# Patient Record
Sex: FEMALE | Race: WHITE | NOT HISPANIC OR LATINO | Employment: FULL TIME | URBAN - METROPOLITAN AREA
[De-identification: names, ages, dates, MRNs, and addresses within clinical notes are randomized per-mention and may not be internally consistent; named-entity substitution may affect disease eponyms.]

---

## 2017-01-19 ENCOUNTER — HOSPITAL ENCOUNTER (OUTPATIENT)
Facility: MEDICAL CENTER | Age: 27
End: 2017-01-19
Payer: COMMERCIAL

## 2017-01-19 LAB
ANION GAP SERPL CALC-SCNC: 10 MMOL/L (ref 0–11.9)
BDY FAT % MEASURED: 47.9 %
BP DIAS: 74 MMHG
BP SYS: 116 MMHG
BUN SERPL-MCNC: 11 MG/DL (ref 8–22)
CALCIUM SERPL-MCNC: 9.6 MG/DL (ref 8.5–10.5)
CHLORIDE SERPL-SCNC: 107 MMOL/L (ref 96–112)
CHOLEST SERPL-MCNC: 197 MG/DL (ref 100–199)
CO2 SERPL-SCNC: 22 MMOL/L (ref 20–33)
CREAT SERPL-MCNC: 0.82 MG/DL (ref 0.5–1.4)
DIABETES HTDIA: NO
EVENT NAME HTEVT: NORMAL
GFR SERPL CREATININE-BSD FRML MDRD: >60 ML/MIN/1.73 M 2
GLUCOSE SERPL-MCNC: 84 MG/DL (ref 65–99)
HDLC SERPL-MCNC: 42 MG/DL
HYPERTENSION HTHYP: NO
LDLC SERPL CALC-MCNC: 135 MG/DL
POTASSIUM SERPL-SCNC: 4.1 MMOL/L (ref 3.6–5.5)
SCREENING LOC CITY HTCIT: NORMAL
SCREENING LOC STATE HTSTA: NORMAL
SCREENING LOCATION HTLOC: NORMAL
SODIUM SERPL-SCNC: 139 MMOL/L (ref 135–145)
SUBSCRIBER ID HTSID: NORMAL
TRIGL SERPL-MCNC: 99 MG/DL (ref 0–149)

## 2017-02-10 ENCOUNTER — TELEPHONE (OUTPATIENT)
Dept: MEDICAL GROUP | Facility: PHYSICIAN GROUP | Age: 27
End: 2017-02-10

## 2017-02-10 DIAGNOSIS — E66.01 MORBID OBESITY WITH BMI OF 40.0-44.9, ADULT (HCC): ICD-10-CM

## 2017-02-11 NOTE — TELEPHONE ENCOUNTER
Expand All Collapse All    1. Caller Name: Pt                                 Call Back Number:[phone] (home)      SPECIFIC Action To Be Taken: Referral needed     2. Diagnosis/Clinical Reason for Request: Gastric Sleeve     3. Has an appt been made? No    4. Specialty & Provider Name/Lab/Imaging Location:  [facility]     5. Patient approves a detailed message N/A    6. Patient preferred communication method: MyChart Voicemail Letter    7. Patient informed they will receive a return phone call from Primary Care office ONLY if there are any questions before processing their request and to call back if they haven't received a call in 5 days.

## 2017-03-09 ENCOUNTER — HOSPITAL ENCOUNTER (OUTPATIENT)
Dept: LAB | Facility: MEDICAL CENTER | Age: 27
End: 2017-03-09
Attending: FAMILY MEDICINE
Payer: COMMERCIAL

## 2017-03-09 LAB
25(OH)D3 SERPL-MCNC: 17 NG/ML (ref 30–100)
ALBUMIN SERPL BCP-MCNC: 4 G/DL (ref 3.2–4.9)
ALBUMIN/GLOB SERPL: 1.4 G/DL
ALP SERPL-CCNC: 75 U/L (ref 30–99)
ALT SERPL-CCNC: 30 U/L (ref 2–50)
ANION GAP SERPL CALC-SCNC: 7 MMOL/L (ref 0–11.9)
AST SERPL-CCNC: 26 U/L (ref 12–45)
BILIRUB SERPL-MCNC: 0.5 MG/DL (ref 0.1–1.5)
BUN SERPL-MCNC: 10 MG/DL (ref 8–22)
CALCIUM SERPL-MCNC: 9.4 MG/DL (ref 8.5–10.5)
CHLORIDE SERPL-SCNC: 105 MMOL/L (ref 96–112)
CO2 SERPL-SCNC: 25 MMOL/L (ref 20–33)
CREAT SERPL-MCNC: 0.72 MG/DL (ref 0.5–1.4)
ERYTHROCYTE [DISTWIDTH] IN BLOOD BY AUTOMATED COUNT: 44.3 FL (ref 35.9–50)
EST. AVERAGE GLUCOSE BLD GHB EST-MCNC: 103 MG/DL
GLOBULIN SER CALC-MCNC: 2.9 G/DL (ref 1.9–3.5)
GLUCOSE SERPL-MCNC: 83 MG/DL (ref 65–99)
HBA1C MFR BLD: 5.2 % (ref 0–5.6)
HCT VFR BLD AUTO: 41.4 % (ref 37–47)
HGB BLD-MCNC: 13.3 G/DL (ref 12–16)
MCH RBC QN AUTO: 28.9 PG (ref 27–33)
MCHC RBC AUTO-ENTMCNC: 32.1 G/DL (ref 33.6–35)
MCV RBC AUTO: 90 FL (ref 81.4–97.8)
PLATELET # BLD AUTO: 309 K/UL (ref 164–446)
PMV BLD AUTO: 9.9 FL (ref 9–12.9)
POTASSIUM SERPL-SCNC: 3.8 MMOL/L (ref 3.6–5.5)
PROT SERPL-MCNC: 6.9 G/DL (ref 6–8.2)
RBC # BLD AUTO: 4.6 M/UL (ref 4.2–5.4)
SODIUM SERPL-SCNC: 137 MMOL/L (ref 135–145)
TSH SERPL DL<=0.005 MIU/L-ACNC: 2.18 UIU/ML (ref 0.3–3.7)
WBC # BLD AUTO: 8.6 K/UL (ref 4.8–10.8)

## 2017-03-09 PROCEDURE — 85027 COMPLETE CBC AUTOMATED: CPT

## 2017-03-09 PROCEDURE — 83036 HEMOGLOBIN GLYCOSYLATED A1C: CPT

## 2017-03-09 PROCEDURE — 82306 VITAMIN D 25 HYDROXY: CPT

## 2017-03-09 PROCEDURE — 80061 LIPID PANEL: CPT

## 2017-03-09 PROCEDURE — 83695 ASSAY OF LIPOPROTEIN(A): CPT

## 2017-03-09 PROCEDURE — 83704 LIPOPROTEIN BLD QUAN PART: CPT

## 2017-03-09 PROCEDURE — 80053 COMPREHEN METABOLIC PANEL: CPT

## 2017-03-09 PROCEDURE — 36415 COLL VENOUS BLD VENIPUNCTURE: CPT

## 2017-03-09 PROCEDURE — 84443 ASSAY THYROID STIM HORMONE: CPT

## 2017-03-11 LAB — LPA SERPL-MCNC: 63 MG/DL

## 2017-03-14 LAB
CHOLEST SERPL-MCNC: 199 MG/DL (ref 100–199)
HDL PARTICAL NO Q4363: 28.8 UMOL/L
HDL SERPL QN: 9.2 NM
HDLC SERPL-MCNC: 46 MG/DL
HLD.LARGE SERPL-SCNC: 4.7 UMOL/L
LDL MED SERPL QN: 21.9 NM
LDL SERPL QN: 21.9 NM
LDL SERPL-SCNC: 1459 NMOL/L
LDL SMALL SERPL-SCNC: 242 NMOL/L
LDL SMALL SERPL-SCNC: 242 NMOL/L
LDLC SERPL CALC-MCNC: 132 MG/DL (ref 0–99)
LP IR SCORE Q4364: 47
TRIGL SERPL-MCNC: 106 MG/DL (ref 0–149)
VLDL LARGE SERPL-SCNC: 4.2 NMOL/L
VLDL SERPL QN: 47.6 NM

## 2017-04-12 ENCOUNTER — APPOINTMENT (OUTPATIENT)
Dept: RADIOLOGY | Facility: MEDICAL CENTER | Age: 27
End: 2017-04-12
Attending: EMERGENCY MEDICINE
Payer: COMMERCIAL

## 2017-04-12 ENCOUNTER — HOSPITAL ENCOUNTER (EMERGENCY)
Facility: MEDICAL CENTER | Age: 27
End: 2017-04-12
Attending: EMERGENCY MEDICINE
Payer: COMMERCIAL

## 2017-04-12 ENCOUNTER — OFFICE VISIT (OUTPATIENT)
Dept: URGENT CARE | Facility: PHYSICIAN GROUP | Age: 27
End: 2017-04-12
Payer: COMMERCIAL

## 2017-04-12 VITALS
TEMPERATURE: 97.8 F | BODY MASS INDEX: 51.91 KG/M2 | RESPIRATION RATE: 16 BRPM | HEART RATE: 76 BPM | WEIGHT: 293 LBS | DIASTOLIC BLOOD PRESSURE: 50 MMHG | HEIGHT: 63 IN | SYSTOLIC BLOOD PRESSURE: 103 MMHG | OXYGEN SATURATION: 97 %

## 2017-04-12 VITALS
HEIGHT: 62 IN | RESPIRATION RATE: 20 BRPM | WEIGHT: 293 LBS | OXYGEN SATURATION: 98 % | SYSTOLIC BLOOD PRESSURE: 144 MMHG | HEART RATE: 102 BPM | BODY MASS INDEX: 53.92 KG/M2 | DIASTOLIC BLOOD PRESSURE: 80 MMHG | TEMPERATURE: 99.3 F

## 2017-04-12 DIAGNOSIS — R10.31 RIGHT LOWER QUADRANT ABDOMINAL PAIN: ICD-10-CM

## 2017-04-12 DIAGNOSIS — R10.30 LOWER ABDOMINAL PAIN: ICD-10-CM

## 2017-04-12 LAB
ALBUMIN SERPL BCP-MCNC: 3.5 G/DL (ref 3.2–4.9)
ALBUMIN/GLOB SERPL: 1.2 G/DL
ALP SERPL-CCNC: 65 U/L (ref 30–99)
ALT SERPL-CCNC: 32 U/L (ref 2–50)
ANION GAP SERPL CALC-SCNC: 7 MMOL/L (ref 0–11.9)
APPEARANCE UR: CLEAR
APPEARANCE UR: CLEAR
AST SERPL-CCNC: 27 U/L (ref 12–45)
BASOPHILS # BLD AUTO: 0.4 % (ref 0–1.8)
BASOPHILS # BLD: 0.05 K/UL (ref 0–0.12)
BILIRUB SERPL-MCNC: 0.6 MG/DL (ref 0.1–1.5)
BILIRUB UR STRIP-MCNC: NORMAL MG/DL
BUN SERPL-MCNC: 9 MG/DL (ref 8–22)
CALCIUM SERPL-MCNC: 8.8 MG/DL (ref 8.4–10.2)
CHLORIDE SERPL-SCNC: 103 MMOL/L (ref 96–112)
CO2 SERPL-SCNC: 27 MMOL/L (ref 20–33)
COLOR UR AUTO: NORMAL
COLOR UR: YELLOW
CREAT SERPL-MCNC: 0.83 MG/DL (ref 0.5–1.4)
EOSINOPHIL # BLD AUTO: 0.25 K/UL (ref 0–0.51)
EOSINOPHIL NFR BLD: 2.1 % (ref 0–6.9)
ERYTHROCYTE [DISTWIDTH] IN BLOOD BY AUTOMATED COUNT: 43.5 FL (ref 35.9–50)
GFR SERPL CREATININE-BSD FRML MDRD: >60 ML/MIN/1.73 M 2
GLOBULIN SER CALC-MCNC: 3 G/DL (ref 1.9–3.5)
GLUCOSE SERPL-MCNC: 91 MG/DL (ref 65–99)
GLUCOSE UR STRIP.AUTO-MCNC: NEGATIVE MG/DL
GLUCOSE UR STRIP.AUTO-MCNC: NORMAL MG/DL
HCG UR QL: NEGATIVE
HCT VFR BLD AUTO: 38.3 % (ref 37–47)
HGB BLD-MCNC: 12.4 G/DL (ref 12–16)
IMM GRANULOCYTES # BLD AUTO: 0.04 K/UL (ref 0–0.11)
IMM GRANULOCYTES NFR BLD AUTO: 0.3 % (ref 0–0.9)
KETONES UR STRIP.AUTO-MCNC: NEGATIVE MG/DL
KETONES UR STRIP.AUTO-MCNC: NORMAL MG/DL
LEUKOCYTE ESTERASE UR QL STRIP.AUTO: ABNORMAL
LEUKOCYTE ESTERASE UR QL STRIP.AUTO: NORMAL
LIPASE SERPL-CCNC: 35 U/L (ref 7–58)
LYMPHOCYTES # BLD AUTO: 3.21 K/UL (ref 1–4.8)
LYMPHOCYTES NFR BLD: 26.8 % (ref 22–41)
MCH RBC QN AUTO: 28.8 PG (ref 27–33)
MCHC RBC AUTO-ENTMCNC: 32.4 G/DL (ref 33.6–35)
MCV RBC AUTO: 88.9 FL (ref 81.4–97.8)
MONOCYTES # BLD AUTO: 0.5 K/UL (ref 0–0.85)
MONOCYTES NFR BLD AUTO: 4.2 % (ref 0–13.4)
NEUTROPHILS # BLD AUTO: 7.93 K/UL (ref 2–7.15)
NEUTROPHILS NFR BLD: 66.2 % (ref 44–72)
NITRITE UR QL STRIP.AUTO: NEGATIVE
NITRITE UR QL STRIP.AUTO: NORMAL
NRBC # BLD AUTO: 0 K/UL
NRBC BLD AUTO-RTO: 0 /100 WBC
PH UR STRIP.AUTO: 6 [PH]
PH UR STRIP.AUTO: 6.5 [PH] (ref 5–8)
PLATELET # BLD AUTO: 272 K/UL (ref 164–446)
PMV BLD AUTO: 9.3 FL (ref 9–12.9)
POTASSIUM SERPL-SCNC: 3.7 MMOL/L (ref 3.6–5.5)
PROT SERPL-MCNC: 6.5 G/DL (ref 6–8.2)
PROT UR QL STRIP: NEGATIVE MG/DL
PROT UR QL STRIP: NORMAL MG/DL
RBC # BLD AUTO: 4.31 M/UL (ref 4.2–5.4)
RBC UR QL AUTO: NEGATIVE
RBC UR QL AUTO: NORMAL
SODIUM SERPL-SCNC: 137 MMOL/L (ref 135–145)
SP GR UR STRIP.AUTO: 1
SP GR UR STRIP.AUTO: >=1.03
UROBILINOGEN UR STRIP-MCNC: NORMAL MG/DL
WBC # BLD AUTO: 12 K/UL (ref 4.8–10.8)

## 2017-04-12 PROCEDURE — 81002 URINALYSIS NONAUTO W/O SCOPE: CPT | Performed by: NURSE PRACTITIONER

## 2017-04-12 PROCEDURE — 36415 COLL VENOUS BLD VENIPUNCTURE: CPT

## 2017-04-12 PROCEDURE — 700105 HCHG RX REV CODE 258: Performed by: EMERGENCY MEDICINE

## 2017-04-12 PROCEDURE — 96361 HYDRATE IV INFUSION ADD-ON: CPT

## 2017-04-12 PROCEDURE — 700111 HCHG RX REV CODE 636 W/ 250 OVERRIDE (IP): Performed by: EMERGENCY MEDICINE

## 2017-04-12 PROCEDURE — 85025 COMPLETE CBC W/AUTO DIFF WBC: CPT

## 2017-04-12 PROCEDURE — 81025 URINE PREGNANCY TEST: CPT

## 2017-04-12 PROCEDURE — 700117 HCHG RX CONTRAST REV CODE 255: Performed by: EMERGENCY MEDICINE

## 2017-04-12 PROCEDURE — 80053 COMPREHEN METABOLIC PANEL: CPT

## 2017-04-12 PROCEDURE — 81002 URINALYSIS NONAUTO W/O SCOPE: CPT

## 2017-04-12 PROCEDURE — 99285 EMERGENCY DEPT VISIT HI MDM: CPT

## 2017-04-12 PROCEDURE — 74177 CT ABD & PELVIS W/CONTRAST: CPT

## 2017-04-12 PROCEDURE — 83690 ASSAY OF LIPASE: CPT

## 2017-04-12 PROCEDURE — 96374 THER/PROPH/DIAG INJ IV PUSH: CPT

## 2017-04-12 PROCEDURE — 99213 OFFICE O/P EST LOW 20 MIN: CPT | Performed by: NURSE PRACTITIONER

## 2017-04-12 RX ORDER — SODIUM CHLORIDE 9 MG/ML
1000 INJECTION, SOLUTION INTRAVENOUS ONCE
Status: COMPLETED | OUTPATIENT
Start: 2017-04-12 | End: 2017-04-12

## 2017-04-12 RX ORDER — DICYCLOMINE HYDROCHLORIDE 10 MG/ML
20 INJECTION INTRAMUSCULAR ONCE
Status: DISCONTINUED | OUTPATIENT
Start: 2017-04-12 | End: 2017-04-12

## 2017-04-12 RX ORDER — TRAMADOL HYDROCHLORIDE 50 MG/1
50 TABLET ORAL EVERY 6 HOURS PRN
Qty: 12 TAB | Refills: 0 | Status: SHIPPED | OUTPATIENT
Start: 2017-04-12 | End: 2018-09-08

## 2017-04-12 RX ORDER — ONDANSETRON 4 MG/1
4 TABLET, ORALLY DISINTEGRATING ORAL EVERY 8 HOURS PRN
Qty: 10 TAB | Refills: 0 | Status: SHIPPED | OUTPATIENT
Start: 2017-04-12 | End: 2018-11-30

## 2017-04-12 RX ADMIN — SODIUM CHLORIDE 1000 ML: 9 INJECTION, SOLUTION INTRAVENOUS at 20:31

## 2017-04-12 RX ADMIN — IOHEXOL 100 ML: 350 INJECTION, SOLUTION INTRAVENOUS at 21:32

## 2017-04-12 RX ADMIN — HYDROMORPHONE HYDROCHLORIDE 1 MG: 1 INJECTION, SOLUTION INTRAMUSCULAR; INTRAVENOUS; SUBCUTANEOUS at 20:57

## 2017-04-12 ASSESSMENT — ENCOUNTER SYMPTOMS
CONSTIPATION: 0
NAUSEA: 0
CHILLS: 0
MYALGIAS: 0
BRUISES/BLEEDS EASILY: 0
FEVER: 0
VOMITING: 0
DIARRHEA: 1
ABDOMINAL PAIN: 1

## 2017-04-12 ASSESSMENT — PAIN SCALES - GENERAL
PAINLEVEL: 7=MODERATE-SEVERE PAIN
PAINLEVEL_OUTOF10: 8
PAINLEVEL_OUTOF10: 5
PAINLEVEL_OUTOF10: 8

## 2017-04-12 NOTE — MR AVS SNAPSHOT
"        Earlene R Jasen   2017 4:50 PM   Office Visit   MRN: [id number]    Department:  [facility]   Dept Phone:  [phone]    Description:  Female : 1990   Provider:  RICA Livingston           Reason for Visit     Pelvic Pain x4 days. Pt complains of pelvic pain. Hurts to walk, sit, touch. Pt denies any abnormal bleeding or discharge.      Allergies as of 2017     Allergen Noted Reactions    Nkda [No Known Drug Allergy] 2010         You were diagnosed with     Lower abdominal pain   [089920]         Vital Signs     Blood Pressure Pulse Temperature Respirations Height Weight    144/80 mmHg 102 37.4 °C (99.3 °F) 20 1.575 m (5' 2\") 133.358 kg (294 lb)    Body Mass Index Oxygen Saturation Breastfeeding? Smoking Status          53.76 kg/m2 98% No Former Smoker        Basic Information     Date Of Birth Sex Race Ethnicity Preferred Language    1990 Female White Non- English      Problem List              ICD-10-CM Priority Class Noted - Resolved    IBS (irritable bowel syndrome) K58.9   Unknown - Present    Migraine G43.909 High  6/3/2010 - Present    Irritable bowel syndrome without diarrhea K58.9 High  2013 - Present    Vision changes H53.9   2015 - Present    Insomnia G47.00   2015 - Present    Morbid obesity with BMI of 40.0-44.9, adult (CMS-HCC) E66.01, Z68.41   2016 - Present    Irregular menses N92.6   2016 - Present    Encounter for monitoring oral hormonal therapy for heavy menses Z51.81, Z79.3, N92.0   2016 - Present    Menorrhagia N92.0   2016 - Present    Dysmenorrhea N94.6   2016 - Present    URI (upper respiratory infection) J06.9   2016 - Present      Health Maintenance        Date Due Completion Dates    PAP SMEAR 2018 (Done), 3/29/2010    Override on 2015: Done    IMM DTaP/Tdap/Td Vaccine (2 - Td) 2024            Results     POCT Urinalysis      Component Value Standard Range & " Units    POC Color Straw Negative    POC Appearance Clear Negative    POC Leukocyte Esterase Neg Negative    POC Nitrites Neg Negative    POC Urobiligen Neg Negative (0.2) mg/dL    POC Protein Neg Negative mg/dL    POC Urine PH 6.5 5.0 - 8.0    POC Blood Neg Negative    POC Specific Gravity 1.005 <1.005 - >1.030    POC Ketones Neg Negative mg/dL    POC Biliruben Neg Negative mg/dL    POC Glucose Neg Negative mg/dL                        Current Immunizations     HPV Quadrivalent Vaccine (GARDASIL) 3/29/2010, 4/17/2008    Influenza TIV (IM) 11/1/2012    Tdap Vaccine 6/8/2014      Below and/or attached are the medications your provider expects you to take. Review all of your home medications and newly ordered medications with your provider and/or pharmacist. Follow medication instructions as directed by your provider and/or pharmacist. Please keep your medication list with you and share with your provider. Update the information when medications are discontinued, doses are changed, or new medications (including over-the-counter products) are added; and carry medication information at all times in the event of emergency situations     Allergies:  NKDA - (reactions not documented)               Medications  Valid as of: April 12, 2017 -  6:03 PM    Generic Name Brand Name Tablet Size Instructions for use    Albuterol Sulfate (Aero Soln) albuterol 108 (90 BASE) MCG/ACT Inhale 2 Puffs by mouth every 6 hours as needed for Shortness of Breath.        Azithromycin (Tab) ZITHROMAX 250 MG Take two tablets by mouth today, then one tablet by mouth daily until finished        Butalbital-APAP-Caff-Cod (Cap) FIORICET W/CODEINE -36-30 MG Take 1 Cap by mouth every 6 hours as needed for Headache.        Butalbital-APAP-Caffeine (Tab) FIORICET -40 MG Take 1 Tab by mouth every four hours as needed for Headache (No more than 2days a week, no more than 6 tabs in 24h).        Eluxadoline (Tab) Eluxadoline 75 MG Take 1 tablet by  mouth 2 times a day.        Guaifenesin-Codeine (Liquid) Guaifenesin-Codeine 200-20 MG/5ML Take 10 mL by mouth 4 times a day as needed.        Ibuprofen (Tab) MOTRIN 200 MG Take 200 mg by mouth every 6 hours as needed. Indications: pt states that she was taking about 6 at a time        Methocarbamol (Tab) ROBAXIN 750 MG Take 2 Tabs by mouth 3 times a day.        Metoclopramide HCl (Tab) REGLAN 10 MG Take 1 Tab by mouth 4 times a day as needed (nausea headache).        Oxycodone-Acetaminophen (Tab) PERCOCET 5-325 MG Take 1 Tab by mouth every 3 hours as needed for Moderate Pain ((Pain Scale 4-6)).        Phentermine HCl (Cap) phentermine 37.5 MG Take 1 Cap by mouth every morning.        Promethazine HCl (Tab) PHENERGAN 25 MG Take 1 Tab by mouth every 6 hours as needed for Nausea/Vomiting.        Rizatriptan Benzoate (TABLET DISPERSIBLE) MAXALT-MLT 10 MG Take 1 Tab by mouth Once PRN for Migraine for up to 1 dose.        SUMAtriptan Succinate (Tab) IMITREX 50 MG Take 1 Tab by mouth Once PRN for Migraine (May repeat dose once after 2 hours) for up to 1 dose.        SUMAtriptan Succinate (Tab) IMITREX 100 MG Take 1 Tab by mouth Once PRN for Migraine for up to 1 dose.        Zolpidem Tartrate (Tab) AMBIEN 5 MG Take 1 Tab by mouth at bedtime as needed for Sleep.        .                 Medicines prescribed today were sent to:     [facility] [address] [place] [address] - [address]     [address] [facility] [address]    Phone: [phone] Fax: [phone]    Open 24 Hours?: No      Medication refill instructions:       If your prescription bottle indicates you have medication refills left, it is not necessary to call your provider’s office. Please contact your pharmacy and they will refill your medication.    If your prescription bottle indicates you do not have any refills left, you may request refills at any time through one of the following ways: The online [employer] system (except Urgent Care), by calling your provider’s  office, or by asking your pharmacy to contact your provider’s office with a refill request. Medication refills are processed only during regular business hours and may not be available until the next business day. Your provider may request additional information or to have a follow-up visit with you prior to refilling your medication.   *Please Note: Medication refills are assigned a new Rx number when refilled electronically. Your pharmacy may indicate that no refills were authorized even though a new prescription for the same medication is available at the pharmacy. Please request the medicine by name with the pharmacy before contacting your provider for a refill.           [employer] Access Code: Activation code not generated  Current [employer] Status: Active

## 2017-04-12 NOTE — ED AVS SNAPSHOT
SpeedDate Access Code: Activation code not generated  Current SpeedDate Status: Active    [employer]  A secure, online tool to manage your health information     [employer]’s SpeedDate® is a secure, online tool that connects you to your personalized health information from the privacy of your home -- day or night - making it very easy for you to manage your healthcare. Once the activation process is completed, you can even access your medical information using the SpeedDate apurva, which is available for free in the Apple Apurva store or Google Play store.     SpeedDate provides the following levels of access (as shown below):   My Chart Features   [facility] Primary Care Doctor [facility]  Specialists [facility]  Urgent  Care Non-[facility]  Primary Care  Doctor   Email your healthcare team securely and privately 24/7 X X X X   Manage appointments: schedule your next appointment; view details of past/upcoming appointments X      Request prescription refills. X      View recent personal medical records, including lab and immunizations X X X X   View health record, including health history, allergies, medications X X X X   Read reports about your outpatient visits, procedures, consult and ER notes X X X X   See your discharge summary, which is a recap of your hospital and/or ER visit that includes your diagnosis, lab results, and care plan. X X       How to register for SpeedDate:  1. Go to  [email or web address].  2. Click on the Sign Up Now box, which takes you to the New Member Sign Up page. You will need to provide the following information:  a. Enter your SpeedDate Access Code exactly as it appears at the top of this page. (You will not need to use this code after you’ve completed the sign-up process. If you do not sign up before the expiration date, you must request a new code.)   b. Enter your date of birth.   c. Enter your home email address.   d. Click Submit, and follow the next screen’s instructions.  3. Create a SpeedDate ID. This will  be your [employer] login ID and cannot be changed, so think of one that is secure and easy to remember.  4. Create a [employer] password. You can change your password at any time.  5. Enter your Password Reset Question and Answer. This can be used at a later time if you forget your password.   6. Enter your e-mail address. This allows you to receive e-mail notifications when new information is available in [employer].  7. Click Sign Up. You can now view your health information.    For assistance activating your [employer] account, call [phone]

## 2017-04-12 NOTE — ED AVS SNAPSHOT
4/12/2017    Earlene Aggarwal  [address]  [place] [address]    Dear Earlene:    [facility] wants to ensure your discharge home is safe and you or your loved ones have had all of your questions answered regarding your care after you leave the hospital.    Below is a list of resources and contact information should you have any questions regarding your hospital stay, follow-up instructions, or active medical symptoms.    Questions or Concerns Regarding… Contact   Medical Questions Related to Your Discharge  (7 days a week, 8am-5pm) Contact a Nurse Care Coordinator   [phone]   Medical Questions Not Related to Your Discharge  (24 hours a day / 7 days a week)  Contact the Nurse Health Line   [phone]    Medications or Discharge Instructions Refer to your discharge packet   or contact your [facility] Primary Care Provider   [phone]   Follow-up Appointment(s) Schedule your appointment via Sevent[employer]m   or contact Scheduling [phone]   Billing Review your statement via [employer]  or contact Billing [phone]   Medical Records Review your records via [employer]   or contact Medical Records [phone]     You may receive a telephone call within two days of discharge. This call is to make certain you understand your discharge instructions and have the opportunity to have any questions answered. You can also easily access your medical information, test results and upcoming appointments via the Seventymm free online health management tool. You can learn more and sign up at [employer]/Seventymm. For assistance setting up your Seventymm account, please call [phone].    Once again, we want to ensure your discharge home is safe and that you have a clear understanding of any next steps in your care. If you have any questions or concerns, please do not hesitate to contact us, we are here for you. Thank you for choosing [facility] for your healthcare needs.    Sincerely,    Your [facility] Healthcare Team

## 2017-04-12 NOTE — ED AVS SNAPSHOT
Home Care Instructions                                                                                                                Earlene Aggarwal   MRN: [id number]    Department:  [facility], Emergency Dept   Date of Visit:  4/12/2017            [facility], Emergency Dept    [address] [place] [address]    Phone:  [phone]      You were seen by     Ildefonso Wilder M.D.      Your Diagnosis Was     Right lower quadrant abdominal pain     R10.31       These are the medications you received during your hospitalization from 04/12/2017 1800 to 04/12/2017 2326     Date/Time Order Dose Route Action    04/12/2017 2031 NS infusion 1,000 mL 1,000 mL Intravenous New Bag    04/12/2017 2057 HYDROmorphone (DILAUDID) injection 1 mg 1 mg Intravenous Given    04/12/2017 2132 iohexol (OMNIPAQUE) 350 mg/mL 100 mL Intravenous Given      Follow-up Information     1. Follow up with CALLY Krueger In 1 day.    Specialty:  Family Medicine    Contact information    [address] [address] [phone]        Medication Information     Review all of your home medications and newly ordered medications with your primary doctor and/or pharmacist as soon as possible. Follow medication instructions as directed by your doctor and/or pharmacist.     Please keep your complete medication list with you and share with your physician. Update the information when medications are discontinued, doses are changed, or new medications (including over-the-counter products) are added; and carry medication information at all times in the event of emergency situations.               Medication List      START taking these medications        Instructions    Morning Afternoon Evening Bedtime    ondansetron 4 MG Tbdp   Commonly known as:  ZOFRAN ODT        Take 1 Tab by mouth every 8 hours as needed for Nausea/Vomiting.   Dose:  4 mg                        tramadol 50 MG Tabs   Commonly known as:  ULTRAM        Take 1 Tab by mouth every 6 hours as needed.   Dose:  50 mg                          ASK your doctor about these medications        Instructions    Morning Afternoon Evening Bedtime    acetaminophen/caffeine/butalbital 325-40-50 mg -40 MG Tabs   Commonly known as:  FIORICET        Take 1 Tab by mouth every four hours as needed for Headache (No more than 2days a week, no more than 6 tabs in 24h).   Dose:  1 Tab                        albuterol 108 (90 BASE) MCG/ACT Aers inhalation aerosol        Inhale 2 Puffs by mouth every 6 hours as needed for Shortness of Breath.   Dose:  2 Puff                        azithromycin 250 MG Tabs   Commonly known as:  ZITHROMAX        Take two tablets by mouth today, then one tablet by mouth daily until finished                        butalbital-acetaminophen-caffeine-codeine -79-30 MG per capsule   Commonly known as:  FIORICET W/CODEINE        Take 1 Cap by mouth every 6 hours as needed for Headache.   Dose:  1 Cap                        Eluxadoline 75 MG Tabs        Take 1 tablet by mouth 2 times a day.   Dose:  1 tablet                        Guaifenesin-Codeine 200-20 MG/5ML Liqd        Take 10 mL by mouth 4 times a day as needed.   Dose:  10 mL                        ibuprofen 200 MG Tabs   Commonly known as:  MOTRIN        Take 200 mg by mouth every 6 hours as needed. Indications: pt states that she was taking about 6 at a time   Dose:  200 mg                        methocarbamol 750 MG Tabs   Commonly known as:  ROBAXIN-750        Take 2 Tabs by mouth 3 times a day.   Dose:  1500 mg                        metoclopramide 10 MG Tabs   Commonly known as:  REGLAN        Take 1 Tab by mouth 4 times a day as needed (nausea headache).   Dose:  10 mg                        oxycodone-acetaminophen 5-325 MG Tabs   Commonly known as:  PERCOCET        Take 1 Tab by mouth every 3 hours as needed for Moderate Pain ((Pain  Scale 4-6)).   Dose:  1 Tab                        phentermine 37.5 MG capsule        Take 1 Cap by mouth every morning.   Dose:  37.5 mg                        promethazine 25 MG Tabs   Commonly known as:  PHENERGAN        Take 1 Tab by mouth every 6 hours as needed for Nausea/Vomiting.   Dose:  25 mg                        rizatriptan 10 MG disintegrating tablet   Commonly known as:  MAXALT-MLT        Take 1 Tab by mouth Once PRN for Migraine for up to 1 dose.   Dose:  10 mg                        sumatriptan 100 MG tablet   Commonly known as:  IMITREX        Take 1 Tab by mouth Once PRN for Migraine for up to 1 dose.   Dose:  100 mg                        zolpidem 5 MG Tabs   Commonly known as:  AMBIEN        Take 1 Tab by mouth at bedtime as needed for Sleep.   Dose:  5 mg                             Where to Get Your Medications      You can get these medications from any pharmacy     Bring a paper prescription for each of these medications    - ondansetron 4 MG Tbdp  - tramadol 50 MG Tabs            Procedures and tests performed during your visit     CBC WITH DIFFERENTIAL    COMP METABOLIC PANEL    CONSENT FOR CONTRAST INJECTION    CT-ABDOMEN-PELVIS WITH    ESTIMATED GFR    IV Saline Lock    LIPASE    POC UA    POC URINE PREGNANCY        Discharge Instructions       Return immediately to the Emergency Department if you experience continuing or worsening discomfort in your abdomen, fever, chest pain, difficulty breathing, back pain, or any other new or worsening symptoms.       Abdominal Pain, Adult  Many things can cause abdominal pain. Usually, abdominal pain is not caused by a disease and will improve without treatment. It can often be observed and treated at home. Your health care provider will do a physical exam and possibly order blood tests and X-rays to help determine the seriousness of your pain. However, in many cases, more time must pass before a clear cause of the pain can be found. Before that  point, your health care provider may not know if you need more testing or further treatment.  HOME CARE INSTRUCTIONS   Monitor your abdominal pain for any changes. The following actions may help to alleviate any discomfort you are experiencing:  · Only take over-the-counter or prescription medicines as directed by your health care provider.  · Do not take laxatives unless directed to do so by your health care provider.  · Try a clear liquid diet (broth, tea, or water) as directed by your health care provider. Slowly move to a bland diet as tolerated.  SEEK MEDICAL CARE IF:  · You have unexplained abdominal pain.  · You have abdominal pain associated with nausea or diarrhea.  · You have pain when you urinate or have a bowel movement.  · You experience abdominal pain that wakes you in the night.  · You have abdominal pain that is worsened or improved by eating food.  · You have abdominal pain that is worsened with eating fatty foods.  · You have a fever.  SEEK IMMEDIATE MEDICAL CARE IF:   · Your pain does not go away within 2 hours.  · You keep throwing up (vomiting).  · Your pain is felt only in portions of the abdomen, such as the right side or the left lower portion of the abdomen.  · You pass bloody or black tarry stools.  MAKE SURE YOU:  · Understand these instructions.    · Will watch your condition.    · Will get help right away if you are not doing well or get worse.       This information is not intended to replace advice given to you by your health care provider. Make sure you discuss any questions you have with your health care provider.     Document Released: 09/27/2006 Document Revised: 01/08/2016 Document Reviewed: 08/27/2014  [employer] Interactive Patient Education ©2016 [employer]            Patient Information     Patient Information    Following emergency treatment: all patient requiring follow-up care must return either to a private physician or a clinic if your condition worsens before you are  able to obtain further medical attention, please return to the emergency room.     Billing Information    At [facility], we work to make the billing process streamlined for our patients.  Our Representatives are here to answer any questions you may have regarding your hospital bill.  If you have insurance coverage and have supplied your insurance information to us, we will submit a claim to your insurer on your behalf.  Should you have any questions regarding your bill, we can be reached online or by phone as follows:  Online: You are able pay your bills online or live chat with our representatives about any billing questions you may have. We are here to help Monday - Friday from 8:00am to 7:30pm and 9:00am - 12:00pm on Saturdays.  Please visit [email or web address]  for more information.   Phone:  [phone] or [phone]    Please note that your emergency physician, surgeon, pathologist, radiologist, anesthesiologist, and other specialists are not employed by [facility] and will therefore bill separately for their services.  Please contact them directly for any questions concerning their bills at the numbers below:     Emergency Physician Services:  [phone]  [facility]:  [phone]  [facility]:  [phone]  [facility]:  [phone]    1. Your final bill may vary from the amount quoted upon discharge if all procedures are not complete at that time, or if your doctor has additional procedures of which we are not aware. You will receive an additional bill if you return to the Emergency Department at [facility] for suture removal regardless of the facility of which the sutures were placed.     2. Please arrange for settlement of this account at the emergency registration.    3. All self-pay accounts are due in full at the time of treatment.  If you are unable to meet this obligation then payment is expected within 4-5  days.     4. If you have had radiology studies (CT, X-ray, Ultrasound, MRI), you have received a preliminary result during your emergency department visit. Please contact the radiology department [phone] to receive a copy of your final result. Please discuss the Final result with your primary physician or with the follow up physician provided.     Crisis Hotline:  [place] Crisis Hotline:  2-536-GVMOOOE or 1-239.185.7421  [place] Crisis Hotline:    1-574.803.1164 or 006-173-2200         ED Discharge Follow Up Questions    1. In order to provide you with very good care, we would like to follow up with a phone call in the next few days.  May we have your permission to contact you?     YES /  NO    2. What is the best phone number to call you? (       )_____-__________    3. What is the best time to call you?      Morning  /  Afternoon  /  Evening                   Patient Signature:  ____________________________________________________________    Date:  ____________________________________________________________

## 2017-04-13 NOTE — ED PROVIDER NOTES
ED Provider Note    CHIEF COMPLAINT  Chief Complaint   Patient presents with   • Abdominal Pain   • Diarrhea       HPI  Earlene Aggarwal is a 26 y.o. female who presents for evaluation of abdominal pain associated with diarrhea, sent here by urgent care to rule out appendicitis. She states she had pain progressing over the past 3 days, some nausea without vomiting but she has felt chills and sweaty. She does also report decreased appetite. The patient reports the pain seems worse on the right side and seemed to radiate all over her abdomen. Her stool. She has a history of IBS but states these symptoms are altogether different than anything she's experienced in the past. No dysuria hematuria. Status post cholecystectomy. No other complaints    REVIEW OF SYSTEMS  Negative for fever, rash, chest pain, dyspnea, vomiting, headache, focal weakness, focal numbness, focal tingling, back pain. All other systems are negative.     PAST MEDICAL HISTORY  Past Medical History   Diagnosis Date   • Pyelonephritis    • IBS (irritable bowel syndrome)    • MRSA (methicillin resistant Staphylococcus aureus) infection 11/08     Culture positive   • Severe cervical dysplasia, histologically confirmed      hpv   • Vision changes 12/8/2015   • Insomnia 12/8/2015       FAMILY HISTORY  Family History   Problem Relation Age of Onset   • Cancer Mother      cervical   • Diabetes Father    • Cancer Maternal Grandmother      lung   • Cancer Paternal Grandfather      melanoma   • Hypertension         SOCIAL HISTORY  Social History   Substance Use Topics   • Smoking status: Former Smoker -- 3 years     Types: Cigarettes     Quit date: 11/01/2008   • Smokeless tobacco: Former User     Types: Chew     Quit date: 01/01/2014      Comment: states quit since pregnant, smoked 4-5 cigs per day for 1-2 years   • Alcohol Use: No      Comment: socially       SURGICAL HISTORY  Past Surgical History   Procedure Laterality Date   • Other       tonsillectomy   •  "Tonsillectomy and adenoidectomy  1999   • Ear middle exploration       ear tubes   • Dilation and curettage cone biopsy  2008     Dr Savage   • Ebony by laparoscopy  9/7/2011     Performed by RHONA JOAQUIN at SURGERY [facility] ORS   • Tubal coagulation laparoscopic bilateral  12/27/2011     Performed by BILL [address] at SURGERY [facility] ORS   • Mammoplasty reduction  8/29/2012     Performed by VIVIEN HERRERA at SURGERY [facility] ORS   • Hysteroscopy thermal ablation N/A 8/20/2016     Procedure: HYSTEROSCOPY THERMAL ABLATION;  Surgeon: Bill Lane M.D.;  Location: SURGERY [facility];  Service:        CURRENT MEDICATIONS  I personally reviewed the medication list in the charting documentation.     ALLERGIES  Allergies   Allergen Reactions   • Nkda [No Known Drug Allergy]        MEDICAL RECORD  I have reviewed patient's medical record and pertinent results are listed above.      PHYSICAL EXAM  VITAL SIGNS: /73 mmHg  Pulse 85  Temp(Src) 37 °C (98.6 °F)  Resp 16  Ht 1.6 m (5' 3\")  Wt 133.6 kg (294 lb 8.6 oz)  BMI 52.19 kg/m2  SpO2 100%   Constitutional: Well appearing patient in no acute distress.  Not toxic, nor ill in appearance.  HENT: Mucus membranes moist.    Eyes: No scleral icterus. Normal conjunctiva   Neck: Supple, comfortable, nonpainful range of motion.   Cardiovascular: Regular heart rate and rhythm.   Thorax & Lungs: Chest is nontender.  Lungs are clear to auscultation with good air movement bilaterally.  No wheeze, rhonchi, nor rales.   Abdomen: Soft, obese, nondistended, diffuse mild tenderness with significantly increased tenderness in the right lower quadrant but no appreciable rebound or guarding  Skin: Warm, dry. No rash appreciated  Extremities/Musculoskeletal: No sign of trauma. No asymmetric calf tenderness, erythema or edema. Normal range of motion   Neurologic: Alert & oriented. No focal deficits observed.   Psychiatric: Normal affect appropriate for the " clinical situation.    DIAGNOSTIC STUDIES / PROCEDURES    LABS  Results for orders placed or performed during the hospital encounter of 04/12/17   CBC WITH DIFFERENTIAL   Result Value Ref Range    WBC 12.0 (H) 4.8 - 10.8 K/uL    RBC 4.31 4.20 - 5.40 M/uL    Hemoglobin 12.4 12.0 - 16.0 g/dL    Hematocrit 38.3 37.0 - 47.0 %    MCV 88.9 81.4 - 97.8 fL    MCH 28.8 27.0 - 33.0 pg    MCHC 32.4 (L) 33.6 - 35.0 g/dL    RDW 43.5 35.9 - 50.0 fL    Platelet Count 272 164 - 446 K/uL    MPV 9.3 9.0 - 12.9 fL    Neutrophils-Polys 66.20 44.00 - 72.00 %    Lymphocytes 26.80 22.00 - 41.00 %    Monocytes 4.20 0.00 - 13.40 %    Eosinophils 2.10 0.00 - 6.90 %    Basophils 0.40 0.00 - 1.80 %    Immature Granulocytes 0.30 0.00 - 0.90 %    Nucleated RBC 0.00 /100 WBC    Neutrophils (Absolute) 7.93 (H) 2.00 - 7.15 K/uL    Lymphs (Absolute) 3.21 1.00 - 4.80 K/uL    Monos (Absolute) 0.50 0.00 - 0.85 K/uL    Eos (Absolute) 0.25 0.00 - 0.51 K/uL    Baso (Absolute) 0.05 0.00 - 0.12 K/uL    Immature Granulocytes (abs) 0.04 0.00 - 0.11 K/uL    NRBC (Absolute) 0.00 K/uL   COMP METABOLIC PANEL   Result Value Ref Range    Sodium 137 135 - 145 mmol/L    Potassium 3.7 3.6 - 5.5 mmol/L    Chloride 103 96 - 112 mmol/L    Co2 27 20 - 33 mmol/L    Anion Gap 7.0 0.0 - 11.9    Glucose 91 65 - 99 mg/dL    Bun 9 8 - 22 mg/dL    Creatinine 0.83 0.50 - 1.40 mg/dL    Calcium 8.8 8.4 - 10.2 mg/dL    AST(SGOT) 27 12 - 45 U/L    ALT(SGPT) 32 2 - 50 U/L    Alkaline Phosphatase 65 30 - 99 U/L    Total Bilirubin 0.6 0.1 - 1.5 mg/dL    Albumin 3.5 3.2 - 4.9 g/dL    Total Protein 6.5 6.0 - 8.2 g/dL    Globulin 3.0 1.9 - 3.5 g/dL    A-G Ratio 1.2 g/dL   LIPASE   Result Value Ref Range    Lipase 35 7 - 58 U/L   ESTIMATED GFR   Result Value Ref Range    GFR If African American >60 >60 mL/min/1.73 m 2    GFR If Non African American >60 >60 mL/min/1.73 m 2   POC UA   Result Value Ref Range    POC Color Yellow     POC Appearance Clear     POC Glucose Negative Negative mg/dL     POC Ketones Negative Negative mg/dL    POC Specific Gravity >=1.030 (A) 1.005-1.030    POC Blood Negative Negative    POC Urine PH 6.0 5.0-8.0    POC Protein Negative Negative mg/dL    POC Nitrites Negative Negative    POC Leukocyte Esterase Trace (A) Negative   POC URINE PREGNANCY   Result Value Ref Range    POC Urine Pregnancy Test Negative          RADIOLOGY  CT-ABDOMEN-PELVIS WITH   Final Result      Mild splenomegaly      Cholecystectomy            COURSE & MEDICAL DECISION MAKING  I have reviewed any medical record information, laboratory studies and radiographic results as noted above.  Differential diagnoses includes: Appendicitis, gastroenteritis, UTI, colitis, ectopic pregnancy, dehydration, electrolyte abnormalities    Encounter Summary: This is a 26 y.o. female with abdominal pain seemingly worse in the right lower quadrant associated with some diarrhea and decreased appetite. She's also had some chills and sweats. On exam she is obese which limits the reliability of examination but does seem to have some increased tenderness in the right lower quadrant. She is not febrile nor tachycardic. Will obtain blood work as well as a CT scan of the abdomen and pelvis and then reevaluated the medication for pain ----- blood work reveals a mild leukocytosis, CT scan reveals some splenomegaly with no acute findings otherwise, normal appendix without any inflammation. Her urinalysis shows trace leukocytes but in the absence of UTI symptoms I do not see this represents an infection. This point, no clear emergent or urgent etiology has been identified but I think she is stable and appropriate for discharge a prescription for Ultram after the p.m. by mouth where database has been queried as well as Zofran and she'll follow with her primary care provider. Strict return injections given.      DISPOSITION: Discharge home in stable condition      FINAL IMPRESSION  1. Right lower quadrant abdominal pain           This  dictation was created using voice recognition software. The accuracy of the dictation is limited to the abilities of the software. I expect there may be some errors of grammar and possibly content. The nursing notes were reviewed and certain aspects of this information were incorporated into this note.    Electronically signed by: Ildefonso Wilder, 4/12/2017 8:01 PM

## 2017-04-13 NOTE — DISCHARGE INSTRUCTIONS
Return immediately to the Emergency Department if you experience continuing or worsening discomfort in your abdomen, fever, chest pain, difficulty breathing, back pain, or any other new or worsening symptoms.       Abdominal Pain, Adult  Many things can cause abdominal pain. Usually, abdominal pain is not caused by a disease and will improve without treatment. It can often be observed and treated at home. Your health care provider will do a physical exam and possibly order blood tests and X-rays to help determine the seriousness of your pain. However, in many cases, more time must pass before a clear cause of the pain can be found. Before that point, your health care provider may not know if you need more testing or further treatment.  HOME CARE INSTRUCTIONS   Monitor your abdominal pain for any changes. The following actions may help to alleviate any discomfort you are experiencing:  · Only take over-the-counter or prescription medicines as directed by your health care provider.  · Do not take laxatives unless directed to do so by your health care provider.  · Try a clear liquid diet (broth, tea, or water) as directed by your health care provider. Slowly move to a bland diet as tolerated.  SEEK MEDICAL CARE IF:  · You have unexplained abdominal pain.  · You have abdominal pain associated with nausea or diarrhea.  · You have pain when you urinate or have a bowel movement.  · You experience abdominal pain that wakes you in the night.  · You have abdominal pain that is worsened or improved by eating food.  · You have abdominal pain that is worsened with eating fatty foods.  · You have a fever.  SEEK IMMEDIATE MEDICAL CARE IF:   · Your pain does not go away within 2 hours.  · You keep throwing up (vomiting).  · Your pain is felt only in portions of the abdomen, such as the right side or the left lower portion of the abdomen.  · You pass bloody or black tarry stools.  MAKE SURE YOU:  · Understand these instructions.     · Will watch your condition.    · Will get help right away if you are not doing well or get worse.       This information is not intended to replace advice given to you by your health care provider. Make sure you discuss any questions you have with your health care provider.     Document Released: 09/27/2006 Document Revised: 01/08/2016 Document Reviewed: 08/27/2014  [employer] Patient Education ©2016 [employer]

## 2017-04-13 NOTE — ED NOTES
Discharged in good condition after discharge instructions reviewed with pt in detail, pt verbalizes understanding of all. Ambulated out with steady gait accompanied by family with follow up instructions in hand. Instructed pt to not drive or drink alcohol while taking pain medications, pt states understanding.

## 2017-04-13 NOTE — PROGRESS NOTES
"Subjective:      Earlene Aggarwal is a 26 y.o. female who presents with Pelvic Pain            HPI Comments: Medications, Allergies and Prior Medical Hx reviewed and updated in [facility].with patient/family today     Pt has hx of IBS,     Now c/o frequent diarrhea aprrox 5 x today, with 8/10 pain sharp at times and aching.     Pelvic Pain  This is a new problem. The current episode started in the past 7 days (3 days). The problem occurs constantly. The problem has been gradually worsening. Associated symptoms include abdominal pain. Pertinent negatives include no chest pain, chills, fever, myalgias, nausea or vomiting. The symptoms are aggravated by bending. She has tried immobilization for the symptoms. The treatment provided moderate relief.       Review of Systems   Constitutional: Negative for fever, chills and malaise/fatigue.   Cardiovascular: Negative for chest pain.   Gastrointestinal: Positive for abdominal pain and diarrhea (x4 ). Negative for nausea, vomiting and constipation.   Genitourinary: Positive for pelvic pain. Negative for dysuria, frequency and hematuria.   Musculoskeletal: Negative for myalgias.   Endo/Heme/Allergies: Does not bruise/bleed easily.          Objective:     /80 mmHg  Pulse 102  Temp(Src) 37.4 °C (99.3 °F)  Resp 20  Ht 1.575 m (5' 2\")  Wt 133.358 kg (294 lb)  BMI 53.76 kg/m2  SpO2 98%  Breastfeeding? No     Physical Exam   Constitutional: She appears well-developed and well-nourished. No distress.   HENT:   Head: Normocephalic and atraumatic.   Eyes: Conjunctivae are normal. Pupils are equal, round, and reactive to light.   Neck: Neck supple.   Cardiovascular: Normal rate, regular rhythm and normal heart sounds.    Pulmonary/Chest: Effort normal and breath sounds normal. No respiratory distress.   Abdominal: Soft. Bowel sounds are normal. She exhibits no distension and no fluid wave. There is tenderness in the right lower quadrant, suprapubic area and left lower quadrant. " There is no rigidity, no guarding, no CVA tenderness, no tenderness at McBurney's point and negative Catalan's sign.   Musculoskeletal: She exhibits no edema.   Neurological: She is alert.   Awake, alert, answering questions appropriately, moving all extremeties   Skin: Skin is warm and dry.   Psychiatric: She has a normal mood and affect. Her behavior is normal.   Vitals reviewed.              Assessment/Plan:       1. Lower abdominal pain  POCT Urinalysis       .Results for SHIRLENE HANCOCK (MRN [id number]) as of 4/12/2017 17:53   4/12/2017 17:10   POC Color Straw   POC Appearance Clear   POC Specific Gravity 1.005   POC Urine PH 6.5   POC Glucose Neg   POC Ketones Neg   POC Protein Neg   POC Nitrites Neg   POC Leukocyte Esterase Neg   POC Blood Neg   POC Biliruben Neg   POC Urobiligen Neg       D/w pt/family recommendation to transfer to ED for evaluation and treatment not available at this facility, they verbalize agreement and request [facility]  D/w Dr Boucher  who accepted patient   Pt will be transported via pvt vehicle

## 2017-08-05 ENCOUNTER — HOSPITAL ENCOUNTER (OUTPATIENT)
Dept: RADIOLOGY | Facility: MEDICAL CENTER | Age: 27
End: 2017-08-05
Attending: PHYSICIAN ASSISTANT
Payer: COMMERCIAL

## 2017-08-05 ENCOUNTER — OCCUPATIONAL MEDICINE (OUTPATIENT)
Dept: URGENT CARE | Facility: PHYSICIAN GROUP | Age: 27
End: 2017-08-05
Payer: COMMERCIAL

## 2017-08-05 VITALS
RESPIRATION RATE: 12 BRPM | OXYGEN SATURATION: 98 % | TEMPERATURE: 97.2 F | HEIGHT: 63 IN | SYSTOLIC BLOOD PRESSURE: 128 MMHG | BODY MASS INDEX: 49.86 KG/M2 | WEIGHT: 281.4 LBS | HEART RATE: 88 BPM | DIASTOLIC BLOOD PRESSURE: 86 MMHG

## 2017-08-05 DIAGNOSIS — S80.12XA CONTUSION OF LEFT LOWER LEG, INITIAL ENCOUNTER: Primary | ICD-10-CM

## 2017-08-05 DIAGNOSIS — M79.662 PAIN IN LEFT SHIN: ICD-10-CM

## 2017-08-05 PROCEDURE — 73564 X-RAY EXAM KNEE 4 OR MORE: CPT | Mod: LT

## 2017-08-05 PROCEDURE — 99213 OFFICE O/P EST LOW 20 MIN: CPT | Mod: 29 | Performed by: PHYSICIAN ASSISTANT

## 2017-08-05 ASSESSMENT — ENCOUNTER SYMPTOMS
CARDIOVASCULAR NEGATIVE: 1
GASTROINTESTINAL NEGATIVE: 1
EYES NEGATIVE: 1
PSYCHIATRIC NEGATIVE: 1
CONSTITUTIONAL NEGATIVE: 1
FALLS: 1
NEUROLOGICAL NEGATIVE: 1

## 2017-08-05 ASSESSMENT — PAIN SCALES - GENERAL: PAINLEVEL: 5=MODERATE PAIN

## 2017-08-05 NOTE — PROGRESS NOTES
"Subjective:      Earlene Aggarwal is a 27 y.o. female who presents with Knee Pain      DOI: 8/2/2017.  Left knee pain anterior aspect just below the knee. ABHAY: large hussain container with printer ink scheduled for removal.  Co-worker opened a garage door and a 500lb drum of isopropyl alcohol spilt and made the floor slippery. Was helping with a coworker to lift the drum when she tripped and landed full force on the left shin/knee.  No numbness or tingling.  Pain radiates downward and into calf.  Pain is a 10/10 with direct pressure.  Pain is 2-3/10 with rest.  Has icing and elevating.  Did try NSAIDs with no improvement.  No previous injuries.  No second job.     Knee Pain        Review of Systems   Constitutional: Negative.    HENT: Negative.    Eyes: Negative.    Cardiovascular: Negative.    Gastrointestinal: Negative.    Genitourinary: Negative.    Musculoskeletal: Positive for joint pain and falls.   Skin: Negative.    Neurological: Negative.    Endo/Heme/Allergies: Negative.    Psychiatric/Behavioral: Negative.           Objective:     /86 mmHg  Pulse 88  Temp(Src) 36.2 °C (97.2 °F)  Resp 12  Ht 1.6 m (5' 2.99\")  Wt 127.642 kg (281 lb 6.4 oz)  BMI 49.86 kg/m2  SpO2 98%     Physical Exam    Constitutional:  Appropriately groomed, pleasant affect, well nourished, and in no acute distress.    HEENT:  Head: Atraumatic, normocephalic.    Ears:  Hearing grossly intact to voice.    Eyes:  Conjunctivae clear, sclera white, and medial canthus without exudate bilaterally.      Lungs:  Lungs with normal respiratory excursion and effort.      Left Knee:  Proximal tibia swelling with slight ecchymosis present.  No ttp across the joint line.  Knee stable with varus and valgus stress.    ROM: Extension 0, Flexion 95.   Patella tracking without crepitus.   No calf tenderness or clinical evidence of a DVT.      Motor Examination: Quad/hamstring 5/5 without atrophy.     Special Tests: Negative straight leg raise.  " Negative bilateral knee valgus and varus stress, McMurrays, and lachman’s.      Sensation equal bilaterally to light touch for L4, L5, and S1.      NVS intact, DP 2+.  Capillary refill <2 seconds.      Gait and station antalgic favoring left leg.    Derm:  No rashes or lesions with good turgor pressure.     Psychiatric:  Normal judgement, mood and affect.     Rudi Freeman M.D. 8/5/2017 Urgent Care                Narrative               8/5/2017 3:38 PM    HISTORY/REASON FOR EXAM:  Left knee pain after ground-level fall 3 days ago.    TECHNIQUE/EXAM DESCRIPTION AND NUMBER OF VIEWS:  4 views of the LEFT knee.    COMPARISON: None    FINDINGS:    The alignment of the knee is within normal limits.  There is no definite  joint effusion.  There is no evidence of displaced fracture or dislocation.  There is no focal swelling.                    Impression               Negative LEFT knee series.               Assessment/Plan:     1. Pain in left shin    2. Contusion of left lower leg, initial encounter  Patient presents with contusion to left knee after a ground-level fall. On exam patient has exquisite tenderness over the proximal tibia. X-ray reviewed by me with patient shows no evidence of fracture. Recommended rice protocol NSAIDs for pain control. Also recommended elevation and ice. Work limitations per D 39. Patient return in 3-4 days for reevaluation.    Patient was in agreement with this treatment plan and seemed to understand without barriers. All questions were encouraged and answered.  Reviewed signs and symptoms of when to seek emergency medical care.     Please note that this dictation was created using voice recognition software.  I have made every reasonable attempt to correct obvious errors, but I expect there are errors of nalini and possibly content that I did not discover before finalizing the note.

## 2017-08-05 NOTE — MR AVS SNAPSHOT
"Earlene Aggarwal   2017 2:30 PM   Occupational Medicine   MRN: [id number]    Department:  [facility]   Dept Phone:  [phone]    Description:  Female : 1990   Provider:  Gunnar Tobias PA-C           Reason for Visit     Knee Pain x 3 days / LT knee pain       Allergies as of 2017     Allergen Noted Reactions    Nkda [No Known Drug Allergy] 2010         You were diagnosed with     Contusion of left lower leg, initial encounter   [850931]  -  Primary     Pain in left shin   [7361040]         Vital Signs     Blood Pressure Pulse Temperature Respirations Height Weight    128/86 mmHg 88 36.2 °C (97.2 °F) 12 1.6 m (5' 2.99\") 127.642 kg (281 lb 6.4 oz)    Body Mass Index Oxygen Saturation Smoking Status             49.86 kg/m2 98% Former Smoker         Basic Information     Date Of Birth Sex Race Ethnicity Preferred Language    1990 Female White Non- English      Problem List              ICD-10-CM Priority Class Noted - Resolved    IBS (irritable bowel syndrome) K58.9   Unknown - Present    Migraine G43.909 High  6/3/2010 - Present    Irritable bowel syndrome without diarrhea K58.9 High  2013 - Present    Vision changes H53.9   2015 - Present    Insomnia G47.00   2015 - Present    Morbid obesity with BMI of 40.0-44.9, adult (CMS-HCC) E66.01, Z68.41   2016 - Present    Irregular menses N92.6   2016 - Present    Encounter for monitoring oral hormonal therapy for heavy menses Z51.81, Z79.3, N92.0   2016 - Present    Menorrhagia N92.0   2016 - Present    Dysmenorrhea N94.6   2016 - Present    URI (upper respiratory infection) J06.9   2016 - Present      Health Maintenance        Date Due Completion Dates    IMM INFLUENZA (1) 2017 (Originally 2017) 2012    PAP SMEAR 2018 (Done), 3/29/2010    Override on 2015: Done    IMM DTaP/Tdap/Td Vaccine (2 - Td) 2024            Current " Immunizations     HPV Quadrivalent Vaccine (GARDASIL) 3/29/2010, 4/17/2008    Influenza TIV (IM) 11/1/2012    Tdap Vaccine 6/8/2014      Below and/or attached are the medications your provider expects you to take. Review all of your home medications and newly ordered medications with your provider and/or pharmacist. Follow medication instructions as directed by your provider and/or pharmacist. Please keep your medication list with you and share with your provider. Update the information when medications are discontinued, doses are changed, or new medications (including over-the-counter products) are added; and carry medication information at all times in the event of emergency situations     Allergies:  NKDA - (reactions not documented)               Medications  Valid as of: August 05, 2017 -  5:12 PM    Generic Name Brand Name Tablet Size Instructions for use    Albuterol Sulfate (Aero Soln) albuterol 108 (90 BASE) MCG/ACT Inhale 2 Puffs by mouth every 6 hours as needed for Shortness of Breath.        Azithromycin (Tab) ZITHROMAX 250 MG Take two tablets by mouth today, then one tablet by mouth daily until finished        Butalbital-APAP-Caff-Cod (Cap) FIORICET W/CODEINE -41-30 MG Take 1 Cap by mouth every 6 hours as needed for Headache.        Butalbital-APAP-Caffeine (Tab) FIORICET -40 MG Take 1 Tab by mouth every four hours as needed for Headache (No more than 2days a week, no more than 6 tabs in 24h).        Eluxadoline (Tab) Eluxadoline 75 MG Take 1 tablet by mouth 2 times a day.        Guaifenesin-Codeine (Liquid) Guaifenesin-Codeine 200-20 MG/5ML Take 10 mL by mouth 4 times a day as needed.        Ibuprofen (Tab) MOTRIN 200 MG Take 200 mg by mouth every 6 hours as needed. Indications: pt states that she was taking about 6 at a time        Methocarbamol (Tab) ROBAXIN 750 MG Take 2 Tabs by mouth 3 times a day.        Metoclopramide HCl (Tab) REGLAN 10 MG Take 1 Tab by mouth 4 times a day as needed  (nausea headache).        Ondansetron (TABLET DISPERSIBLE) ZOFRAN ODT 4 MG Take 1 Tab by mouth every 8 hours as needed for Nausea/Vomiting.        Oxycodone-Acetaminophen (Tab) PERCOCET 5-325 MG Take 1 Tab by mouth every 3 hours as needed for Moderate Pain ((Pain Scale 4-6)).        Phentermine HCl (Cap) phentermine 37.5 MG Take 1 Cap by mouth every morning.        Promethazine HCl (Tab) PHENERGAN 25 MG Take 1 Tab by mouth every 6 hours as needed for Nausea/Vomiting.        Rizatriptan Benzoate (TABLET DISPERSIBLE) MAXALT-MLT 10 MG Take 1 Tab by mouth Once PRN for Migraine for up to 1 dose.        SUMAtriptan Succinate (Tab) IMITREX 100 MG Take 1 Tab by mouth Once PRN for Migraine for up to 1 dose.        TraMADol HCl (Tab) ULTRAM 50 MG Take 1 Tab by mouth every 6 hours as needed.        Zolpidem Tartrate (Tab) AMBIEN 5 MG Take 1 Tab by mouth at bedtime as needed for Sleep.        .                 Medicines prescribed today were sent to:     [facility] [address] [place] [address] [address] [facility]    [address] [place] [address]    Phone: [phone] Fax: [phone]    Open 24 Hours?: No    [facility] [address] [place] [address] [address] [facility]    [address] [facility] [address]    Phone: [phone] Fax: [phone]    Open 24 Hours?: No      Medication refill instructions:       If your prescription bottle indicates you have medication refills left, it is not necessary to call your provider’s office. Please contact your pharmacy and they will refill your medication.    If your prescription bottle indicates you do not have any refills left, you may request refills at any time through one of the following ways: The online [employer] system (except Urgent Care), by calling your provider’s office, or by asking your pharmacy to contact your provider’s office with a refill request. Medication refills are processed only during regular business hours and may not be available until the next business day. Your  provider may request additional information or to have a follow-up visit with you prior to refilling your medication.   *Please Note: Medication refills are assigned a new Rx number when refilled electronically. Your pharmacy may indicate that no refills were authorized even though a new prescription for the same medication is available at the pharmacy. Please request the medicine by name with the pharmacy before contacting your provider for a refill.        Instructions    600mg-800mg every 6-8 hours with food.    Elevate and ice.  Compression.  Return in 3-4 days for recheck.          [employer] Access Code: Activation code not generated  Current [employer] Status: Active

## 2017-08-05 NOTE — Clinical Note
[facility] [address] [place] [address]  Phone: [phone] - Fax: [phone]        [facility] Progress Report and Disability Certification  Date of Service: 8/5/2017   No Show:  No  Date / Time of Next Visit: 8/8/2017   Claim Information   Patient Name: Earlene Aggarwal  Claim Number:     Employer:   [employer] Date of Injury: 8/2/2017     Insurer / TPA: [employer]  ID / SSN:     Occupation:    Diagnosis: The primary encounter diagnosis was Contusion of left lower leg, initial encounter. A diagnosis of Pain in left shin was also pertinent to this visit.    Medical Information   Related to Industrial Injury? Yes    Subjective Complaints:  DOI: 8/2/2017.  Left knee pain anterior aspect just below the knee. ABHAY: large hussain container with printer ink scheduled for removal.  Co-worker opened a garage door and a 500lb drum of isopropyl alcohol spilt and made the floor slippery. Was helping with a coworker to lift the drum when she tripped and landed full force on the left shin/knee.  No numbness or tingling.  Pain radiates downward and into calf.  Pain is a 10/10 with direct pressure.  Pain is 2-3/10 with rest.  Has icing and elevating.  Did try NSAIDs with no improvement.  No previous injuries.  No second job.   Objective Findings: Left Knee:  Proximal tibia swelling with slight ecchymosis present.  No ttp across the joint line.  Knee stable with varus and valgus stress.    ROM: Extension 0, Flexion 95.   Patella tracking without crepitus.   No calf tenderness or clinical evidence of a DVT.      Motor Examination: Quad/hamstring 5/5 without atrophy.     Special Tests: Negative straight leg raise.  Negative bilateral knee valgus and varus stress, McMurrays, and lachman’s.      Sensation equal bilaterally to light touch for L4, L5, and S1.      NVS intact, DP 2+.  Capillary refill <2 seconds.      Gait and station  antalgic favoring left leg.     Pre-Existing Condition(s): none   Assessment:   Initial Visit    Status: Additional Care Required  Permanent Disability:No    Plan:   Comments:RICE protocol.  NSAIDs for pain.  Follow-up in 3-4 days for re-eval.    Diagnostics: X-ray  Comments:Negative for fracture    Comments:       Disability Information   Status: Released to Restricted Duty    From:  8/5/2017  Through: 8/8/2017 Restrictions are: Temporary   Physical Restrictions   Sitting:    Standing:    Stooping:    Bending:      Squatting:    Walking:  < or = to 1 hr/day Climbing:    Pushing:      Pulling:    Other:    Reaching Above Shoulder (L):   Reaching Above Shoulder (R):       Reaching Below Shoulder (L):    Reaching Below Shoulder (R):      Not to exceed Weight Limits   Carrying(hrs):   Weight Limit(lb):   Lifting(hrs):   Weight  Limit(lb):     Comments: Recommend limiting walking to less than 1 hour a day.  Elevate leg and ice as much as possible.    Repetitive Actions   Hands: i.e. Fine Manipulations from Grasping:     Feet: i.e. Operating Foot Controls:     Driving / Operate Machinery:     Physician Name: Gunnar Tobias PA-C Physician Signature: GUNNAR Resendez PA-C e-Signature: Dr. Nick Nichols, Medical Director   Clinic Name / Location: [address] [address] Clinic Phone Number: Dept: [phone]   Appointment Time: 2:30 Pm Visit Start Time: 2:55 PM   Check-In Time:  2:44 Pm Visit Discharge Time:  4:15PM   Original-Treating Physician or Chiropractor    Page 2-Insurer/TPA    Page 3-Employer    Page 4-Employee

## 2017-08-05 NOTE — Clinical Note
"EMPLOYEE’S CLAIM FOR COMPENSATION/ REPORT OF INITIAL TREATMENT  FORM C-4    EMPLOYEE’S CLAIM - PROVIDE ALL INFORMATION REQUESTED   First Name  Earlene Last Name  Jasen Birthdate                    1990                Sex  female Claim Number   Home Address  [address] Age  27 y.o. Height  1.6 m (5' 2.99\") Weight  127.642 kg (281 lb 6.4 oz) [facility]     [facility] Zip  [address] Telephone  [phone] (home)    Mailing Address  [address] [facility] Zip  [address] Primary Language Spoken  English    Insurer  MISC  Third Party   [employer]   Employee's Occupation (Job Title) When Injury or Occupational Disease Occurred       Employer's Name    [employer]  Telephone   [phone]   Employer Address   [address]  [place] [facility] Zip   [address]   Date of Injury  8/2/2017               Hour of Injury  7:30 AM Date Employer Notified  8/2/2017 Last Day of Work after Injury or Occupational Disease  8/4/2017 Supervisor to Whom Injury Reported  Omayra Matthews /Blayne GARCIA.   Address or Location of Accident (if applicable)  [[address] ]   What were you doing at the time of accident? (if applicable)  Trying to help  fallen Drum     How did this injury or occupational disease occur? (Be specific an answer in detail. Use additional sheet if necessary)  Blayne & I were trying to get hussain outside we moved a bunch of stuff & while opening door the 55 gallon drum dumped over I was trying to help pick it up & slipped on liquid & fell on left knee/shin   If you believe that you have an occupational disease, when did you first have knowledge of the disability and it relationship to your employment?  na Witnesses to the Accident  Blayne nolasco       Nature of Injury or Occupational Disease  Contusion  Part(s) of Body Injured or Affected  Lower Leg " (L), Knee (L),     I certify that the above is true and correct to the best of my knowledge and that I have provided this information in order to obtain the benefits of [place]  I hereby authorize any physician, chiropractor, surgeon, practitioner, or other person, any hospital, including [facility] or [facility], any medical service organization, any insurance company, or other institution or organization to release to each other, any medical or other information, including benefits paid or payable, pertinent to this injury or disease, except information relative to diagnosis, treatment and/or counseling for AIDS, psychological conditions, alcohol or controlled substances, for which I must give specific authorization.  A Photostat of this authorization shall be as valid as the original.     Date   Place   Employee’s Signature   THIS REPORT MUST BE COMPLETED AND MAILED WITHIN 3 WORKING DAYS OF TREATMENT   Place  [facility]  Name of Facility  [facility]   Date  8/5/2017 Diagnosis  (S80.12XA) Contusion of left lower leg, initial encounter  (primary encounter diagnosis)  (M79.662) Pain in left shin Is there evidence the injured employee was under the influence of alcohol and/or another controlled substance at the time of accident?   Hour  2:55 PM Description of Injury or Disease  The primary encounter diagnosis was Contusion of left lower leg, initial encounter. A diagnosis of Pain in left shin was also pertinent to this visit. No   Treatment  RICE protocol.  NSAIDs for pain.  Follow-up in 3-4 days for re-eval.  Have you advised the patient to remain off work five days or more? No   X-Ray Findings  Negative   If Yes   From Date  To Date      From information given by the employee, together with medical evidence, can you directly connect this injury or occupational disease as  "job incurred?  Yes If No Full Duty  No Modified Duty  Yes   Is additional medical care by a physician indicated?  Yes If Modified Duty, Specify any Limitations / Restrictions   Recommend limiting walking to less than 1 hour a day.  Elevate leg and ice as much as possible.   Do you know of any previous injury or disease contributing to this condition or occupational disease?                            No   Date  8/5/2017 Print Doctor’s Name Gunnar Tobias PA-C I certify the employer’s copy of  this form was mailed on:   Address  [address] [facility] Insurer’s Use Only     [facility]  [address]    Provider’s Tax ID Number  [id number]  Telephone  Dept: [phone]        e-GUNNAR Castillo PA-C   e-Signature: Dr. Nick Nichols, Medical Director Degree  MANUEL        ORIGINAL-TREATING PHYSICIAN OR CHIROPRACTOR    PAGE 2-INSURER/TPA    PAGE 3-EMPLOYER    PAGE 4-EMPLOYEE             Form C-4 (rev10/07)              BRIEF DESCRIPTION OF RIGHTS AND BENEFITS  (Pursuant to [place]RS 616C.050)    Notice of Injury or Occupational Disease (Incident Report Form C-1): If an injury or occupational disease (OD) arises out of and in the  course of employment, you must provide written notice to your employer as soon as practicable, but no later than 7 days after the accident or  OD. Your employer shall maintain a sufficient supply of the required forms.    Claim for Compensation (Form C-4): If medical treatment is sought, the form C-4 is available at the place of initial treatment. A completed  \"Claim for Compensation\" (Form C-4) must be filed within 90 days after an accident or OD. The treating physician or chiropractor must,  within 3 working days after treatment, complete and mail to the employer, the employer's insurer and third-party , the Claim for  Compensation.    Medical Treatment: If you require medical treatment for your on-the-job injury or OD, you may be required to select a physician " or  chiropractor from a list provided by your workers’ compensation insurer, if it has contracted with an Organization for Managed Care (MCO) or  Preferred Provider Organization (PPO) or providers of health care. If your employer has not entered into a contract with an MCO or PPO, you  may select a physician or chiropractor from the Panel of Physicians and Chiropractors. Any medical costs related to your industrial injury or  OD will be paid by your insurer.    Temporary Total Disability (TTD): If your doctor has certified that you are unable to work for a period of at least 5 consecutive days, or 5  cumulative days in a 20-day period, or places restrictions on you that your employer does not accommodate, you may be entitled to TTD  compensation.    Temporary Partial Disability (TPD): If the wage you receive upon reemployment is less than the compensation for TTD to which you are  entitled, the insurer may be required to pay you TPD compensation to make up the difference. TPD can only be paid for a maximum of 24  months.    Permanent Partial Disability (PPD): When your medical condition is stable and there is an indication of a PPD as a result of your injury or  OD, within 30 days, your insurer must arrange for an evaluation by a rating physician or chiropractor to determine the degree of your PPD. The  amount of your PPD award depends on the date of injury, the results of the PPD evaluation and your age and wage.    Permanent Total Disability (PTD): If you are medically certified by a treating physician or chiropractor as permanently and totally disabled  and have been granted a PTD status by your insurer, you are entitled to receive monthly benefits not to exceed 66 2/3% of your average  monthly wage. The amount of your PTD payments is subject to reduction if you previously received a PPD award.    Vocational Rehabilitation Services: You may be eligible for vocational rehabilitation services if you are unable to  return to the job due to a  permanent physical impairment or permanent restrictions as a result of your injury or occupational disease.    Transportation and Per Jose Luis Reimbursement: You may be eligible for travel expenses and per jose luis associated with medical treatment.    Reopening: You may be able to reopen your claim if your condition worsens after claim closure.    Appeal Process: If you disagree with a written determination issued by the insurer or the insurer does not respond to your request, you may  appeal to the Department of Administration, , by following the instructions contained in your determination letter. You must  appeal the determination within 70 days from the date of the determination letter at [address], [address], [place], [place]  [address], or [address], [address], [place], [place] [address]. If you disagree with the  decision, you may appeal to the  Department of Administration, . You must file your appeal within 30 days from the date of the  decision  letter at [address], [address], [place], [place] [address], or [address] [facility] [address], [place] [address]. If you  disagree with a decision of an , you may file a petition for judicial review with the District Court. You must do so within 30  days of the Appeal Officer’s decision. You may be represented by an  at your own expense or you may contact the [facility] for possible  representation.    [place]  for Injured Workers (NAIW): If you disagree with a  decision, you may request that NAIW represent you  without charge at an  Hearing. For information regarding denial of benefits, you may contact the [facility] at: [address], [address], [place] [address], [phone], or [address] [facility] [address], [place] [address], [phone]) [phone]    To File a Complaint with the  Division: If you wish to file a complaint with the  of the Division of Industrial Relations (DIR),  please contact the Workers’ Compensation Section, [address], [address], [place], [place] [address], telephone (888) [phone], or  [address], [address], [place], [place] [address], telephone (991) [phone].    For assistance with Workers’ Compensation Issues: you may contact the Office of the Governor Consumer Health Assistance, [address] [place] [address], [address], [place], [place] [address], Toll Free 1-699.778.2860, Web site: http://[employer].[facility].nv.us, E-mail  [email or web address].nv.                                                                                                                                                                                                                                   __________________________________________________________________                                                                   _________________                Employee Name / Signature                                                                                                                                                       Date                                                                                                                                                                                                     D-2 (rev. 10/07)

## 2017-08-05 NOTE — PATIENT INSTRUCTIONS
600mg-800mg every 6-8 hours with food.    Elevate and ice.  Compression.  Return in 3-4 days for recheck.

## 2017-09-10 ENCOUNTER — OCCUPATIONAL MEDICINE (OUTPATIENT)
Dept: URGENT CARE | Facility: PHYSICIAN GROUP | Age: 27
End: 2017-09-10
Payer: COMMERCIAL

## 2017-09-10 VITALS
RESPIRATION RATE: 16 BRPM | WEIGHT: 270 LBS | HEART RATE: 82 BPM | TEMPERATURE: 97.7 F | OXYGEN SATURATION: 98 % | DIASTOLIC BLOOD PRESSURE: 90 MMHG | HEIGHT: 63 IN | BODY MASS INDEX: 47.84 KG/M2 | SYSTOLIC BLOOD PRESSURE: 126 MMHG

## 2017-09-10 DIAGNOSIS — S80.12XA: ICD-10-CM

## 2017-09-10 PROCEDURE — 99213 OFFICE O/P EST LOW 20 MIN: CPT | Performed by: PHYSICIAN ASSISTANT

## 2017-09-10 ASSESSMENT — ENCOUNTER SYMPTOMS
NEUROLOGICAL NEGATIVE: 1
RESPIRATORY NEGATIVE: 1
CARDIOVASCULAR NEGATIVE: 1
BRUISES/BLEEDS EASILY: 0
CONSTITUTIONAL NEGATIVE: 1

## 2017-09-10 NOTE — LETTER
[facility] [address] [place] [address]  Phone: [phone] - Fax: [phone]        [facility] Progress Report and Disability Certification  Date of Service: 9/10/2017   No Show:  No  Date / Time of Next Visit: 9/15/2017   Claim Information   Patient Name: Earlene Aggarwal  Claim Number:     Employer:    Date of Injury: 8/2/2017     Insurer / TPA: Katie Sanchez  ID / SSN:     Occupation:    Diagnosis: The encounter diagnosis was Contusion of lower leg, left.    Medical Information   Related to Industrial Injury? Yes    Subjective Complaints:  DOI: 8/2/2017.  Left knee pain anterior aspect just below the knee. ABHAY: large hussain container with printer ink scheduled for removal.  Co-worker opened a garage door and a 500lb drum of isopropyl alcohol spilt and made the floor slippery. Was helping with a coworker to lift the drum when she tripped and landed full force on the left shin/knee.   Patient states she has not gotten any better in the last month really.  She still has pain with direct pressure, cannot kneel.  She does have numbness of skin directly over the injury site.  At this time is not doing any interventions.     Objective Findings: Vitals reviewed  Left lower leg:  There is no ecchymosis or erythema.  Minimal appreciable localized superior-lateral swelling of calf.   TTP directly over the area of swelling.  Distally CMS is full and intact.  No distal swelling.     Pre-Existing Condition(s): None    Assessment:   Condition Same    Status: Discharged / Care Transfer  Permanent Disability:No    Plan:      Diagnostics: X-ray  Comments:NEG 08/02/17    Comments:       Disability Information   Status: Released to Full Duty    From:  9/10/2017  Through: 9/15/2017 Restrictions are: Temporary   Physical Restrictions   Sitting:    Standing:    Stooping:    Bending:      Squatting:    Walking:    Climbing:    Pushing:      Pulling:   Other:    Reaching Above Shoulder (L):   Reaching Above Shoulder (R):       Reaching Below Shoulder (L):    Reaching Below Shoulder (R):      Not to exceed Weight Limits   Carrying(hrs):   Weight Limit(lb):   Lifting(hrs):   Weight  Limit(lb):     Comments: Full duty   Patient will be sent for follow up with [facility] at this point  Recommend trial of 600 mg Ibuprofen BID with food and warm compresses.    RTC precautions in meantime.     Repetitive Actions   Hands: i.e. Fine Manipulations from Grasping:     Feet: i.e. Operating Foot Controls:     Driving / Operate Machinery:     Physician Name: Billy Durham P.A.-C. Physician Signature: BILLY Guaman P.A.-C. e-Signature: Dr. Nick Nichols, Medical Director   Clinic Name / Location: [address] [address] Clinic Phone Number: Dept: [phone]   Appointment Time: 4:45 Pm Visit Start Time: 4:55 PM   Check-In Time:  4:51 Pm Visit Discharge Time:  529pm   Original-Treating Physician or Chiropractor    Page 2-Insurer/TPA    Page 3-Employer    Page 4-Employee

## 2017-09-11 NOTE — PROGRESS NOTES
Subjective:      Earlene Aggarwal is a 27 y.o. female who presents with Follow-Up (wc f/v knee injury doi 8/3/17, )      DOI: 8/2/2017.  Left knee pain anterior aspect just below the knee. ABHAY: large hussain container with printer ink scheduled for removal.  Co-worker opened a garage door and a 500lb drum of isopropyl alcohol spilt and made the floor slippery. Was helping with a coworker to lift the drum when she tripped and landed full force on the left shin/knee.   Patient states she has not gotten any better in the last month really.  She still has pain with direct pressure, cannot kneel.  She does have numbness of skin directly over the injury site.  At this time is not doing any interventions.       HPI   As above.     Review of Systems   Constitutional: Negative.    Respiratory: Negative.    Cardiovascular: Negative.    Musculoskeletal:        SEE HPI   Skin: Negative.    Neurological: Negative.    Endo/Heme/Allergies: Does not bruise/bleed easily.       PMH:  has a past medical history of IBS (irritable bowel syndrome); Insomnia (12/8/2015); MRSA (methicillin resistant Staphylococcus aureus) infection (11/08); Pyelonephritis; Severe cervical dysplasia, histologically confirmed; and Vision changes (12/8/2015).  MEDS:   Current Outpatient Prescriptions:   •  ondansetron (ZOFRAN ODT) 4 MG TABLET DISPERSIBLE, Take 1 Tab by mouth every 8 hours as needed for Nausea/Vomiting., Disp: 10 Tab, Rfl: 0  •  tramadol (ULTRAM) 50 MG Tab, Take 1 Tab by mouth every 6 hours as needed., Disp: 12 Tab, Rfl: 0  •  acetaminophen/caffeine/butalbital 325-40-50 mg (FIORICET) -40 MG Tab, Take 1 Tab by mouth every four hours as needed for Headache (No more than 2days a week, no more than 6 tabs in 24h)., Disp: 30 Tab, Rfl: 0  •  sumatriptan (IMITREX) 100 MG tablet, Take 1 Tab by mouth Once PRN for Migraine for up to 1 dose., Disp: 10 Tab, Rfl: 3  •  albuterol 108 (90 BASE) MCG/ACT Aero Soln inhalation aerosol, Inhale 2  Puffs by mouth every 6 hours as needed for Shortness of Breath., Disp: 8.5 g, Rfl: 1  •  azithromycin (ZITHROMAX) 250 MG Tab, Take two tablets by mouth today, then one tablet by mouth daily until finished, Disp: 6 Tab, Rfl: 0  •  Guaifenesin-Codeine 200-20 MG/5ML Liquid, Take 10 mL by mouth 4 times a day as needed., Disp: 240 mL, Rfl: 0  •  phentermine 37.5 MG capsule, Take 1 Cap by mouth every morning., Disp: 30 Cap, Rfl: 0  •  zolpidem (AMBIEN) 5 MG Tab, Take 1 Tab by mouth at bedtime as needed for Sleep., Disp: 30 Tab, Rfl: 0  •  metoclopramide (REGLAN) 10 MG Tab, Take 1 Tab by mouth 4 times a day as needed (nausea headache)., Disp: 20 Tab, Rfl: 0  •  methocarbamol (ROBAXIN-750) 750 MG Tab, Take 2 Tabs by mouth 3 times a day., Disp: 40 Tab, Rfl: 0  •  butalbital-acetaminophen-caffeine-codeine (FIORICET W/CODEINE) -47-30 MG per capsule, Take 1 Cap by mouth every 6 hours as needed for Headache., Disp: 20 Cap, Rfl: 0  •  promethazine (PHENERGAN) 25 MG Tab, Take 1 Tab by mouth every 6 hours as needed for Nausea/Vomiting., Disp: 10 Tab, Rfl: 0  •  ibuprofen (MOTRIN) 200 MG Tab, Take 200 mg by mouth every 6 hours as needed. Indications: pt states that she was taking about 6 at a time, Disp: , Rfl:   •  oxycodone-acetaminophen (PERCOCET) 5-325 MG Tab, Take 1 Tab by mouth every 3 hours as needed for Moderate Pain ((Pain Scale 4-6))., Disp: 30 Tab, Rfl: 0  •  Eluxadoline 75 MG Tab, Take 1 tablet by mouth 2 times a day., Disp: 60 Tab, Rfl: 0  •  rizatriptan (MAXALT-MLT) 10 MG disintegrating tablet, Take 1 Tab by mouth Once PRN for Migraine for up to 1 dose., Disp: 10 Tab, Rfl: 3  ALLERGIES:   Allergies   Allergen Reactions   • Nkda [No Known Drug Allergy]      SURGHX:   Past Surgical History:   Procedure Laterality Date   • HYSTEROSCOPY THERMAL ABLATION N/A 8/20/2016    Procedure: HYSTEROSCOPY THERMAL ABLATION;  Surgeon: Bill Lane M.D.;  Location: SURGERY [facility];  Service:    • MAMMOPLASTY REDUCTION   "8/29/2012    Performed by VIVIEN HERRERA at SURGERY [facility] ORS   • TUBAL COAGULATION LAPAROSCOPIC BILATERAL  12/27/2011    Performed by RIVER DOVE at SURGERY [facility] ORS   • CASSANDRA BY LAPAROSCOPY  9/7/2011    Performed by RHONA JOAQUIN at SURGERY [facility] ORS   • DILATION AND CURETTAGE CONE BIOPSY  2008    Dr Savage   • TONSILLECTOMY AND ADENOIDECTOMY  1999   • EAR MIDDLE EXPLORATION      ear tubes   • OTHER      tonsillectomy     SOCHX:  reports that she quit smoking about 3 years ago. Her smoking use included Cigarettes. She quit after 3.00 years of use. She quit smokeless tobacco use about 3 years ago. Her smokeless tobacco use included Chew. She reports that she drinks about 1.2 oz of alcohol per week . She reports that she does not use drugs.  FH: Family history was reviewed, no pertinent findings to report     Objective:     /90   Pulse 82   Temp 36.5 °C (97.7 °F)   Resp 16   Ht 1.6 m (5' 3\")   Wt 122.5 kg (270 lb)   SpO2 98%   BMI 47.83 kg/m²      Physical Exam   Constitutional: She is oriented to person, place, and time. She appears well-developed and well-nourished.   Cardiovascular: Normal rate.    Pulmonary/Chest: Effort normal.   Neurological: She is alert and oriented to person, place, and time.   Skin: Skin is warm and dry. No rash noted. No erythema.   Psychiatric: She has a normal mood and affect. Her behavior is normal.     Vitals reviewed  Left lower leg:  There is no ecchymosis or erythema.  Minimal appreciable localized superior-lateral swelling of calf.   TTP directly over the area of swelling.  Distally CMS is full and intact.  No distal swelling.         Assessment/Plan:     1. Contusion of lower leg, left         Full duty   Patient will be sent for follow up with [facility] at this point  Recommend trial of 600 mg Ibuprofen BID with food and warm compresses.    RTC precautions in meantime.       Bria Durham P.A.-C.        "

## 2017-09-28 ENCOUNTER — OCCUPATIONAL MEDICINE (OUTPATIENT)
Dept: OCCUPATIONAL MEDICINE | Facility: CLINIC | Age: 27
End: 2017-09-28
Payer: COMMERCIAL

## 2017-09-28 VITALS
BODY MASS INDEX: 48.2 KG/M2 | SYSTOLIC BLOOD PRESSURE: 124 MMHG | DIASTOLIC BLOOD PRESSURE: 84 MMHG | HEIGHT: 63 IN | RESPIRATION RATE: 14 BRPM | WEIGHT: 272 LBS | HEART RATE: 91 BPM | OXYGEN SATURATION: 98 % | TEMPERATURE: 99 F

## 2017-09-28 DIAGNOSIS — S80.02XD CONTUSION OF LEFT KNEE AND LOWER LEG, SUBSEQUENT ENCOUNTER: ICD-10-CM

## 2017-09-28 DIAGNOSIS — S80.12XD CONTUSION OF LEFT KNEE AND LOWER LEG, SUBSEQUENT ENCOUNTER: ICD-10-CM

## 2017-09-28 PROCEDURE — 99204 OFFICE O/P NEW MOD 45 MIN: CPT | Performed by: PREVENTIVE MEDICINE

## 2017-09-28 RX ORDER — PREDNISONE 20 MG/1
40 TABLET ORAL DAILY
Qty: 14 TAB | Refills: 0 | Status: SHIPPED | OUTPATIENT
Start: 2017-09-28 | End: 2017-10-05

## 2017-09-28 NOTE — LETTER
[address],   [address] [address]  Phone:  [phone] - Fax:  [phone]   [facility] Progress Report and Disability Certification  Date of Service: 9/28/2017   No Show:  No  Date / Time of Next Visit: 10/24/2017@9:20AM    Claim Information   Patient Name: Earlene Aggarwal  Claim Number:     Employer:  [employer]    Date of Injury: 8/2/2017     Insurer / TPA: Katie Sanchez  ID / SSN:     Occupation:    Diagnosis: The encounter diagnosis was Contusion of left knee and lower leg, subsequent encounter.    Medical Information   Related to Industrial Injury? Yes    Subjective Complaints:  DOI 8/2/2017:  Patient tripped on floor after a spill incident at work and landed full force on the left shin/knee. Seen in UCx2, XR of left knee was normal. Patient states that overall she does not have much pain. She states it is painful when she pushes in certain location just below the knee. This was signed sharp pain through the area and is sometimes associated with numbness and tingling distal to that area. She denies previous knee injury. She states she is able to do her job finding can walk without difficulty.   Objective Findings: Left knee: No gross deformity. Tenderness to palpation just distal to the tibial plateau. Full range of motion of the knee without difficulty. Anterior/posterior drawer test negative. Jaziel's test negative. Strength intact. Reports decreased sensation to light touch distal and lateral to tibial plateau.   Pre-Existing Condition(s):     Assessment:   Condition Same    Status: Additional Care Required  Permanent Disability:No    Plan:      Diagnostics:      Comments:  Given continued symptoms will refer for MRI of left knee  Prescribed prednisone 40 mg ×7 days  Continue full duty  Follow-up 3 weeks    Disability Information   Status: Released to Full Duty    From:  9/28/2017  Through: 10/24/2017  Restrictions are: Temporary   Physical Restrictions   Sitting:    Standing:    Stooping:    Bending:      Squatting:    Walking:    Climbing:    Pushing:      Pulling:    Other:    Reaching Above Shoulder (L):   Reaching Above Shoulder (R):       Reaching Below Shoulder (L):    Reaching Below Shoulder (R):      Not to exceed Weight Limits   Carrying(hrs):   Weight Limit(lb):   Lifting(hrs):   Weight  Limit(lb):     Comments:      Repetitive Actions   Hands: i.e. Fine Manipulations from Grasping:     Feet: i.e. Operating Foot Controls:     Driving / Operate Machinery:     Physician Name: Stephon Hernandez D.O. Physician Signature: ivettSignSTEPHON HERNANDEZ D.O. e-Signature: Dr. Nick Nichols, Medical Director   Clinic Name / Location: [address],   [address] [address] Clinic Phone Number: Dept: [phone]   Appointment Time: 8:40 Am Visit Start Time: 8:37 AM   Check-In Time:  8:25 Am Visit Discharge Time: @9:22AM    Original-Treating Physician or Chiropractor    Page 2-Insurer/TPA    Page 3-Employer    Page 4-Employee

## 2017-09-28 NOTE — PROGRESS NOTES
"Subjective:      Earlene Aggarwal is a 27 y.o. female who presents with Follow-Up ([facility] (L) Knee feeling the same)      DOI 8/2/2017:  Patient tripped on floor after a spill incident at work and landed full force on the left shin/knee. Seen in UCx2, XR of left knee was normal. Patient states that overall she does not have much pain. She states it is painful when she pushes in certain location just below the knee. This was signed sharp pain through the area and is sometimes associated with numbness and tingling distal to that area. She denies previous knee injury. She states she is able to do her job finding can walk without difficulty.     HPI    ROS  ROS: All systems were reviewed on intake form, form was reviewed and signed. See scanned documents in media. Pertinent positives and negatives included in HPI.    PMH: No pertinent past medical history to this problem  MEDS: Medications were reviewed in Epic  ALLERGIES:   Allergies   Allergen Reactions   • Nkda [No Known Drug Allergy]      SOCHX: Works as a  at [employer]   FH: No pertinent family history to this problem       Objective:     /84   Pulse 91   Temp 37.2 °C (99 °F)   Resp 14   Ht 1.6 m (5' 3\")   Wt 123.4 kg (272 lb)   SpO2 98%   BMI 48.18 kg/m²      Physical Exam   Constitutional: She is oriented to person, place, and time. She appears well-developed and well-nourished.   HENT:   Right Ear: External ear normal.   Left Ear: External ear normal.   Eyes: Conjunctivae and EOM are normal.   Cardiovascular: Normal rate.    Pulmonary/Chest: Effort normal. No respiratory distress.   Neurological: She is alert and oriented to person, place, and time.   Skin: Skin is warm and dry.   Psychiatric: She has a normal mood and affect. Judgment normal.       Left knee: No gross deformity. Tenderness to palpation just distal to the tibial plateau. Full range of motion of the knee without difficulty. Anterior/posterior drawer test " negative. Jaziel's test negative. Strength intact. Reports decreased sensation to light touch distal and lateral to tibial plateau.       Assessment/Plan:     1. Contusion of left knee and lower leg, subsequent encounter  - REFERRAL TO RADIOLOGY  - MR-KNEE-W/O LEFT; Future  - predniSONE (DELTASONE) 20 MG Tab; Take 2 Tabs by mouth every day for 7 days.  Dispense: 14 Tab; Refill: 0    Given continued symptoms will refer for MRI of right knee  Prescribed prednisone 40 mg ×7 days  Continue full duty  Follow-up 3 weeks

## 2017-09-28 NOTE — LETTER
[address],   [address] [address]  Phone:  [phone] - Fax:  [phone]   [facility] Progress Report and Disability Certification  Date of Service: 9/28/2017   No Show:  No  Date / Time of Next Visit: 10/24/2017   Claim Information   Patient Name: Earlene Aggarwal  Claim Number:     Employer:    Date of Injury: 8/2/2017     Insurer / TPA: Katie Sanchez  ID / SSN:     Occupation:    Diagnosis: The encounter diagnosis was Contusion of left knee and lower leg, subsequent encounter.    Medical Information   Related to Industrial Injury? Yes    Subjective Complaints:  DOI 8/2/2017:  Patient tripped on floor after a spill incident at work and landed full force on the left shin/knee. Seen in UCx2, XR of left knee was normal. Patient states that overall she does not have much pain. She states it is painful when she pushes in certain location just below the knee. This was signed sharp pain through the area and is sometimes associated with numbness and tingling distal to that area. She denies previous knee injury. She states she is able to do her job finding can walk without difficulty.   Objective Findings: Left knee: No gross deformity. Tenderness to palpation just distal to the tibial plateau. Full range of motion of the knee without difficulty. Anterior/posterior drawer test negative. Jaziel's test negative. Strength intact. Reports decreased sensation to light touch distal and lateral to tibial plateau.   Pre-Existing Condition(s):     Assessment:   Condition Same    Status: Additional Care Required  Permanent Disability:No    Plan:      Diagnostics:      Comments:  Given continued symptoms will refer for MRI of left knee  Prescribed prednisone 40 mg ×7 days  Continue full duty  Follow-up 3 weeks    Disability Information   Status: Released to Full Duty    From:  9/28/2017  Through: 10/24/2017 Restrictions are:    Physical  Restrictions   Sitting:    Standing:    Stooping:    Bending:      Squatting:    Walking:    Climbing:    Pushing:      Pulling:    Other:    Reaching Above Shoulder (L):   Reaching Above Shoulder (R):       Reaching Below Shoulder (L):    Reaching Below Shoulder (R):      Not to exceed Weight Limits   Carrying(hrs):   Weight Limit(lb):   Lifting(hrs):   Weight  Limit(lb):     Comments:      Repetitive Actions   Hands: i.e. Fine Manipulations from Grasping:     Feet: i.e. Operating Foot Controls:     Driving / Operate Machinery:     Physician Name: Stephon Terrell D.O. Physician Signature: STEPHON Aaron D.O. e-Signature: Dr. Nick Nichols, Medical Director   Clinic Name / Location: [address],   [address] [place] [address] Clinic Phone Number: Dept: [phone]   Appointment Time: 8:40 Am Visit Start Time: 8:37 AM   Check-In Time:  8:25 Am Visit Discharge Time: 9:31 Am   Original-Treating Physician or Chiropractor    Page 2-Insurer/TPA    Page 3-Employer    Page 4-Employee

## 2017-10-24 ENCOUNTER — OCCUPATIONAL MEDICINE (OUTPATIENT)
Dept: OCCUPATIONAL MEDICINE | Facility: CLINIC | Age: 27
End: 2017-10-24
Payer: COMMERCIAL

## 2017-10-24 VITALS
HEIGHT: 63 IN | SYSTOLIC BLOOD PRESSURE: 114 MMHG | OXYGEN SATURATION: 94 % | TEMPERATURE: 97.6 F | DIASTOLIC BLOOD PRESSURE: 80 MMHG | HEART RATE: 100 BPM | BODY MASS INDEX: 48.2 KG/M2 | WEIGHT: 272 LBS | RESPIRATION RATE: 16 BRPM

## 2017-10-24 DIAGNOSIS — S80.02XD CONTUSION OF LEFT KNEE AND LOWER LEG, SUBSEQUENT ENCOUNTER: ICD-10-CM

## 2017-10-24 DIAGNOSIS — S80.12XD CONTUSION OF LEFT KNEE AND LOWER LEG, SUBSEQUENT ENCOUNTER: ICD-10-CM

## 2017-10-24 PROCEDURE — 99212 OFFICE O/P EST SF 10 MIN: CPT | Performed by: PREVENTIVE MEDICINE

## 2017-10-24 ASSESSMENT — PAIN SCALES - GENERAL: PAINLEVEL: NO PAIN

## 2017-10-24 NOTE — LETTER
[address],   [address] [address]  Phone:  [phone] - Fax:  [phone]   [facility] Progress Report and Disability Certification  Date of Service: 10/24/2017   No Show:  No  Date / Time of Next Visit:  MMI2   Claim Information   Patient Name: Earlene Aggarwal  Claim Number:     Employer:   [employer] Date of Injury: 8/2/2017     Insurer / TPA: Katie Sanchez  ID / SSN:     Occupation:    Diagnosis: The encounter diagnosis was Contusion of left knee and lower leg, subsequent encounter.    Medical Information   Related to Industrial Injury? No    Subjective Complaints:  DOI 8/2/2017:  Patient tripped on floor after a spill incident at work and landed full force on the left shin/knee. Seen in UCx2, XR of left knee was normal. Patient states that the overall feels the same. She states she has no pain with walking, hiking, jumping or any activity. She only feels some pain when she pushes near tibial plateau. MRI was performed over week ago.   Objective Findings: Left knee: No gross deformity. Mild Tenderness to palpation just distal to the tibial plateau. Full range of motion of the knee without difficulty. Able to squat without difficulty.   Pre-Existing Condition(s):     Assessment:   Condition Improved    Status: Discharged /  MMI  Permanent Disability:No    Plan:      Diagnostics:      Comments:  MRI Left Knee: mild chondral fissuring lateral  Given minimal findings of MRI and minimal symptoms will release from care  Full duty  Follow-up as needed    Disability Information   Status: Released to Full Duty    From:  10/24/2017  Through:   Restrictions are:     Physical Restrictions   Sitting:    Standing:    Stooping:    Bending:      Squatting:    Walking:    Climbing:    Pushing:      Pulling:    Other:    Reaching Above Shoulder (L):   Reaching Above Shoulder (R):       Reaching Below Shoulder (L):    Reaching Below  Shoulder (R):      Not to exceed Weight Limits   Carrying(hrs):   Weight Limit(lb):   Lifting(hrs):   Weight  Limit(lb):     Comments:      Repetitive Actions   Hands: i.e. Fine Manipulations from Grasping:     Feet: i.e. Operating Foot Controls:     Driving / Operate Machinery:     Physician Name: Stephon Terrell D.O. Physician Signature: STEPHON Aaron D.O. e-Signature: Dr. Nick Nichols, Medical Director   Clinic Name / Location: [address],   [address] [place] [address] Clinic Phone Number: Dept: [phone]   Appointment Time: 9:20 Am Visit Start Time: 9:25 AM   Check-In Time:  9:19 Am Visit Discharge Time:  9:37am   Original-Treating Physician or Chiropractor    Page 2-Insurer/TPA    Page 3-Employer    Page 4-Employee

## 2017-10-24 NOTE — PROGRESS NOTES
"Subjective:      Earlene Aggarwal is a 27 y.o. female who presents with Follow-Up ([facility] DOI:8/2/17 left knee feels the same ROOM #24)      DOI 8/2/2017:  Patient tripped on floor after a spill incident at work and landed full force on the left shin/knee. Seen in UCx2, XR of left knee was normal. Patient states that the overall feels the same. She states she has no pain with walking, hiking, jumping or any activity. She only feels some pain when she pushes near tibial plateau. MRI was performed over week ago.     HPI    ROS       Objective:     /80   Pulse 100   Temp 36.4 °C (97.6 °F)   Resp 16   Ht 1.6 m (5' 3\")   Wt 123.4 kg (272 lb)   SpO2 94%   Breastfeeding? No   BMI 48.18 kg/m²      Physical Exam    Left knee: No gross deformity. Mild Tenderness to palpation just distal to the tibial plateau. Full range of motion of the knee without difficulty. Able to squat without difficulty.       Assessment/Plan:     1. Contusion of left knee and lower leg, subsequent encounter  MRI Left Knee: mild chondral fissuring lateral  Given minimal findings of MRI and minimal symptoms will release from care  Full duty  Follow-up as needed      "

## 2017-12-09 ENCOUNTER — OFFICE VISIT (OUTPATIENT)
Dept: URGENT CARE | Facility: PHYSICIAN GROUP | Age: 27
End: 2017-12-09
Payer: COMMERCIAL

## 2017-12-09 VITALS
DIASTOLIC BLOOD PRESSURE: 78 MMHG | RESPIRATION RATE: 16 BRPM | HEIGHT: 63 IN | OXYGEN SATURATION: 96 % | WEIGHT: 287 LBS | BODY MASS INDEX: 50.85 KG/M2 | TEMPERATURE: 98.2 F | HEART RATE: 63 BPM | SYSTOLIC BLOOD PRESSURE: 126 MMHG

## 2017-12-09 DIAGNOSIS — K08.89 PAIN, DENTAL: ICD-10-CM

## 2017-12-09 DIAGNOSIS — K04.7 DENTAL INFECTION: ICD-10-CM

## 2017-12-09 PROCEDURE — 99214 OFFICE O/P EST MOD 30 MIN: CPT | Performed by: FAMILY MEDICINE

## 2017-12-09 RX ORDER — AMOXICILLIN 875 MG/1
875 TABLET, COATED ORAL 2 TIMES DAILY
Qty: 20 TAB | Refills: 0 | Status: SHIPPED | OUTPATIENT
Start: 2017-12-09 | End: 2017-12-19

## 2017-12-09 RX ORDER — OXYCODONE AND ACETAMINOPHEN 7.5; 325 MG/1; MG/1
1 TABLET ORAL EVERY 6 HOURS PRN
Qty: 10 TAB | Refills: 0 | Status: SHIPPED | OUTPATIENT
Start: 2017-12-09 | End: 2018-09-08

## 2017-12-09 ASSESSMENT — ENCOUNTER SYMPTOMS: WEIGHT LOSS: 0

## 2017-12-10 NOTE — PROGRESS NOTES
"Subjective:      Earlene Aggarwal is a 27 y.o. female who presents with Dental Pain (started yesterday)            2 days upper left bicuspid toothache. Severe. No change with bite pressure. Constant. Minimal relief ibuprofen. Previous filling and ? Need for root canal. No fever. No drainage. No other aggravating or alleviating factors.          Review of Systems   Constitutional: Negative for malaise/fatigue and weight loss.   HENT: Negative for ear pain and sore throat.         No trismus     Skin: Negative for itching and rash.          Objective:     /78   Pulse 63   Temp 36.8 °C (98.2 °F)   Resp 16   Ht 1.6 m (5' 3\")   Wt (!) 130.2 kg (287 lb)   SpO2 96%   BMI 50.84 kg/m²      Physical Exam   Constitutional: She appears well-developed and well-nourished. No distress.   HENT:   Head: Normocephalic and atraumatic.   Right Ear: External ear normal.   Left Ear: External ear normal.   Mouth/Throat:                   Assessment/Plan:     1. Dental infection     2. Pain, dental  amoxicillin (AMOXIL) 875 MG tablet    oxycodone-acetaminophen (PERCOCET) 7.5-325 MG per tablet     Differential diagnosis, natural history, supportive care, and indications for immediate follow-up discussed at length.   F/u dentist  "

## 2017-12-14 ASSESSMENT — ENCOUNTER SYMPTOMS: SORE THROAT: 0

## 2018-01-11 ENCOUNTER — HOSPITAL ENCOUNTER (OUTPATIENT)
Facility: MEDICAL CENTER | Age: 28
End: 2018-01-11
Payer: COMMERCIAL

## 2018-01-11 LAB
ALBUMIN SERPL BCP-MCNC: 4.3 G/DL (ref 3.2–4.9)
ALBUMIN/GLOB SERPL: 1.4 G/DL
ALP SERPL-CCNC: 67 U/L (ref 30–99)
ALT SERPL-CCNC: 20 U/L (ref 2–50)
ANION GAP SERPL CALC-SCNC: 9 MMOL/L (ref 0–11.9)
AST SERPL-CCNC: 17 U/L (ref 12–45)
BDY FAT % MEASURED: 47.5 %
BILIRUB SERPL-MCNC: 0.5 MG/DL (ref 0.1–1.5)
BP DIAS: 90 MMHG
BP SYS: 126 MMHG
BUN SERPL-MCNC: 11 MG/DL (ref 8–22)
CALCIUM SERPL-MCNC: 9.2 MG/DL (ref 8.5–10.5)
CHLORIDE SERPL-SCNC: 107 MMOL/L (ref 96–112)
CHOLEST SERPL-MCNC: 182 MG/DL (ref 100–199)
CO2 SERPL-SCNC: 21 MMOL/L (ref 20–33)
CREAT SERPL-MCNC: 0.66 MG/DL (ref 0.5–1.4)
DIABETES HTDIA: NO
EVENT NAME HTEVT: NORMAL
GLOBULIN SER CALC-MCNC: 3 G/DL (ref 1.9–3.5)
GLUCOSE SERPL-MCNC: 78 MG/DL (ref 65–99)
HDLC SERPL-MCNC: 51 MG/DL
HYPERTENSION HTHYP: NO
LDLC SERPL CALC-MCNC: 107 MG/DL
POTASSIUM SERPL-SCNC: 4 MMOL/L (ref 3.6–5.5)
PROT SERPL-MCNC: 7.3 G/DL (ref 6–8.2)
SCREENING LOC CITY HTCIT: NORMAL
SCREENING LOC STATE HTSTA: NORMAL
SCREENING LOCATION HTLOC: NORMAL
SODIUM SERPL-SCNC: 137 MMOL/L (ref 135–145)
SUBSCRIBER ID HTSID: NORMAL
TRIGL SERPL-MCNC: 119 MG/DL (ref 0–149)

## 2018-04-10 ENCOUNTER — HOSPITAL ENCOUNTER (EMERGENCY)
Facility: MEDICAL CENTER | Age: 28
End: 2018-04-10
Attending: EMERGENCY MEDICINE
Payer: COMMERCIAL

## 2018-04-10 ENCOUNTER — APPOINTMENT (OUTPATIENT)
Dept: RADIOLOGY | Facility: MEDICAL CENTER | Age: 28
End: 2018-04-10
Attending: EMERGENCY MEDICINE
Payer: COMMERCIAL

## 2018-04-10 ENCOUNTER — HOSPITAL ENCOUNTER (EMERGENCY)
Facility: MEDICAL CENTER | Age: 28
End: 2018-04-10
Payer: COMMERCIAL

## 2018-04-10 VITALS
HEIGHT: 63 IN | DIASTOLIC BLOOD PRESSURE: 84 MMHG | BODY MASS INDEX: 48.73 KG/M2 | TEMPERATURE: 98.1 F | OXYGEN SATURATION: 97 % | RESPIRATION RATE: 15 BRPM | WEIGHT: 275 LBS | HEART RATE: 79 BPM | SYSTOLIC BLOOD PRESSURE: 134 MMHG

## 2018-04-10 DIAGNOSIS — S39.012A STRAIN OF LUMBAR REGION, INITIAL ENCOUNTER: ICD-10-CM
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
## 2018-04-10 DIAGNOSIS — S20.219A CONTUSION OF CHEST WALL, UNSPECIFIED LATERALITY, INITIAL ENCOUNTER: ICD-10-CM

## 2018-04-10 DIAGNOSIS — M79.18 MUSCULOSKELETAL PAIN: ICD-10-CM

## 2018-04-10 PROCEDURE — 72100 X-RAY EXAM L-S SPINE 2/3 VWS: CPT

## 2018-04-10 PROCEDURE — 700102 HCHG RX REV CODE 250 W/ 637 OVERRIDE(OP): Performed by: EMERGENCY MEDICINE

## 2018-04-10 PROCEDURE — A9270 NON-COVERED ITEM OR SERVICE: HCPCS | Performed by: EMERGENCY MEDICINE

## 2018-04-10 PROCEDURE — 99284 EMERGENCY DEPT VISIT MOD MDM: CPT

## 2018-04-10 PROCEDURE — 71045 X-RAY EXAM CHEST 1 VIEW: CPT

## 2018-04-10 PROCEDURE — 307740 HCHG GREEN TRAUMA TEAM SERVICES

## 2018-04-10 RX ORDER — IBUPROFEN 600 MG/1
600 TABLET ORAL ONCE
Status: COMPLETED | OUTPATIENT
Start: 2018-04-10 | End: 2018-04-10

## 2018-04-10 RX ORDER — ACETAMINOPHEN 325 MG/1
650 TABLET ORAL ONCE
Status: COMPLETED | OUTPATIENT
Start: 2018-04-10 | End: 2018-04-10

## 2018-04-10 RX ADMIN — IBUPROFEN 600 MG: 600 TABLET, FILM COATED ORAL at 08:44

## 2018-04-10 RX ADMIN — ACETAMINOPHEN 650 MG: 325 TABLET, FILM COATED ORAL at 08:44

## 2018-04-10 ASSESSMENT — PAIN SCALES - GENERAL: PAINLEVEL_OUTOF10: 7
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
## 2018-04-10 NOTE — ED NOTES
Pt verbalized understanding of DC instructions, pt with no further questions. Pt ambulate out of ED with steady gait.
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
## 2018-04-10 NOTE — ED NOTES
[facility] is medicated for pain
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
## 2018-09-08 ENCOUNTER — OFFICE VISIT (OUTPATIENT)
Dept: URGENT CARE | Facility: PHYSICIAN GROUP | Age: 28
End: 2018-09-08
Payer: COMMERCIAL

## 2018-09-08 VITALS
WEIGHT: 287 LBS | OXYGEN SATURATION: 96 % | RESPIRATION RATE: 20 BRPM | BODY MASS INDEX: 50.84 KG/M2 | DIASTOLIC BLOOD PRESSURE: 82 MMHG | HEART RATE: 97 BPM | SYSTOLIC BLOOD PRESSURE: 100 MMHG | TEMPERATURE: 99.4 F

## 2018-09-08 DIAGNOSIS — R68.89 FLU-LIKE SYMPTOMS: ICD-10-CM

## 2018-09-08 DIAGNOSIS — J06.9 VIRAL URI WITH COUGH: ICD-10-CM

## 2018-09-08 DIAGNOSIS — R06.02 SHORTNESS OF BREATH: ICD-10-CM

## 2018-09-08 LAB
FLUAV+FLUBV AG SPEC QL IA: NEGATIVE
INT CON NEG: NEGATIVE
INT CON POS: POSITIVE

## 2018-09-08 PROCEDURE — 99214 OFFICE O/P EST MOD 30 MIN: CPT | Performed by: NURSE PRACTITIONER

## 2018-09-08 PROCEDURE — 87804 INFLUENZA ASSAY W/OPTIC: CPT | Performed by: NURSE PRACTITIONER

## 2018-09-08 RX ORDER — BENZONATATE 100 MG/1
100 CAPSULE ORAL 3 TIMES DAILY PRN
Qty: 15 CAP | Refills: 0 | Status: SHIPPED | OUTPATIENT
Start: 2018-09-08 | End: 2018-09-13

## 2018-09-08 RX ORDER — IPRATROPIUM BROMIDE AND ALBUTEROL SULFATE 2.5; .5 MG/3ML; MG/3ML
3 SOLUTION RESPIRATORY (INHALATION) ONCE
Status: COMPLETED | OUTPATIENT
Start: 2018-09-08 | End: 2018-09-08

## 2018-09-08 RX ORDER — CODEINE PHOSPHATE AND GUAIFENESIN 10; 100 MG/5ML; MG/5ML
5 SOLUTION ORAL EVERY 6 HOURS PRN
Qty: 75 ML | Refills: 0 | Status: SHIPPED | OUTPATIENT
Start: 2018-09-08 | End: 2018-09-12

## 2018-09-08 RX ORDER — ALBUTEROL SULFATE 2.5 MG/3ML
2.5 SOLUTION RESPIRATORY (INHALATION) EVERY 4 HOURS PRN
Qty: 30 BULLET | Refills: 0 | Status: SHIPPED | OUTPATIENT
Start: 2018-09-08 | End: 2018-09-18

## 2018-09-08 RX ADMIN — IPRATROPIUM BROMIDE AND ALBUTEROL SULFATE 3 ML: 2.5; .5 SOLUTION RESPIRATORY (INHALATION) at 15:17

## 2018-09-08 ASSESSMENT — ENCOUNTER SYMPTOMS
COUGH: 1
HEMOPTYSIS: 0
MYALGIAS: 1
SPUTUM PRODUCTION: 0
WHEEZING: 0
SORE THROAT: 1
SHORTNESS OF BREATH: 1
DIAPHORESIS: 0
WEAKNESS: 0
FEVER: 1
CHILLS: 1
SINUS PAIN: 1

## 2018-09-08 NOTE — PROGRESS NOTES
Subjective:      Earlene Aggarwal is a 28 y.o. female who presents with Cough (cough,chest hurts,started yesterday)            Patient comes in today with fever, chills, myalgias, runny nose with sinus pressure, bilateral otalgia, pharyngitis, non-productive cough and shortness of breath since yesterday.  She is taking OTC cold medicine with minimal relief.  Non-smoker for cigarettes.  She does smoke marijuana.  Her son had pneumonia in early August, but has since recovered.          Review of Systems   Constitutional: Positive for chills, fever and malaise/fatigue. Negative for diaphoresis.   HENT: Positive for congestion, ear pain, sinus pain and sore throat.    Respiratory: Positive for cough and shortness of breath. Negative for hemoptysis, sputum production and wheezing.    Cardiovascular: Negative for chest pain.   Musculoskeletal: Positive for myalgias.   Skin: Negative for rash.   Neurological: Negative for weakness.     Medications, Allergies, and current problem list reviewed today in Epic     Objective:     /82   Pulse 97   Temp 37.4 °C (99.4 °F)   Resp 20   Wt (!) 130.2 kg (287 lb)   SpO2 96%   BMI 50.84 kg/m²      Physical Exam   Constitutional: She is oriented to person, place, and time. She appears well-developed and well-nourished. No distress.   Patient appears fatigued but non-toxic.   HENT:   Head: Normocephalic.   Mouth/Throat: No oropharyngeal exudate.   Nares patent.  Profuse clear nasal drainage.  Diffuse sinus pressure on TTP.  Bilateral clear middle ear effusion.     Eyes: Pupils are equal, round, and reactive to light. Conjunctivae are normal. Right eye exhibits no discharge. Left eye exhibits no discharge. No scleral icterus.   Neck: Normal range of motion. Neck supple. No JVD present. No tracheal deviation present. No thyromegaly present.   Cardiovascular: Normal rate, regular rhythm and normal heart sounds.  Exam reveals no gallop and no friction rub.    No murmur  heard.  Pulmonary/Chest: Effort normal and breath sounds normal. No stridor. No respiratory distress. She has no wheezes. She has no rales. She exhibits no tenderness.   Frequent dry cough in clinic.   Musculoskeletal: She exhibits no edema.   Lymphadenopathy:     She has no cervical adenopathy.   Neurological: She is alert and oriented to person, place, and time.   Skin: Skin is warm and dry. No rash noted. She is not diaphoretic. No erythema.   Vitals reviewed.    In clinic medication: duoneb.  Patient tolerated well with relief of subjective chest tightness.  Lungs clear to auscultation.     POCT influenza a/b: negative     Assessment/Plan:     1. Viral URI with cough  guaifenesin-codeine (CHERATUSSIN AC) Solution oral solution    benzonatate (TESSALON) 100 MG Cap   2. Shortness of breath  ipratropium-albuterol (DUONEB) nebulizer solution    albuterol (PROVENTIL) 2.5mg/3ml Nebu Soln solution for nebulization   3. Flu-like symptoms  POCT Influenza A/B     Advised patient that based on the history and exam findings, this is likely a self-limiting viral illness.  There is no indication for antibiotics at this time.  OTC NSAIDs or tylenol prn fever, pain.  OTC oral decongestants and delsym prn symptom management.  Cheratussin as prescribed prn cough. Sedation, dependence, addiction, and constipation precautions discussed.   reviewed with no evidence of overuse or misuse.  Tessalon as prescribed.  Maintain adequate po hydration.  RTC in 5-7 days if symptoms persist, sooner if worse.  ED precautions discussed.  Patient verbalized understanding of and agreed with plan of care.

## 2018-09-08 NOTE — LETTER
September 8, 2018         Patient: Earlene Aggarwal   YOB: 1990   Date of Visit: 9/8/2018           To Whom it May Concern:    Earlene Aggarwal was seen in my clinic on 9/8/2018. She may return to work on 9/10/18 or 9/11/18 as symptoms improve.    If you have any questions or concerns, please don't hesitate to call.        Sincerely,           NOEMI Rush.  Electronically Signed

## 2018-09-11 ENCOUNTER — OFFICE VISIT (OUTPATIENT)
Dept: URGENT CARE | Facility: PHYSICIAN GROUP | Age: 28
End: 2018-09-11
Payer: COMMERCIAL

## 2018-09-11 ENCOUNTER — HOSPITAL ENCOUNTER (OUTPATIENT)
Dept: RADIOLOGY | Facility: MEDICAL CENTER | Age: 28
End: 2018-09-11
Attending: NURSE PRACTITIONER
Payer: COMMERCIAL

## 2018-09-11 VITALS
HEART RATE: 96 BPM | TEMPERATURE: 98.1 F | BODY MASS INDEX: 48.12 KG/M2 | HEIGHT: 63 IN | DIASTOLIC BLOOD PRESSURE: 60 MMHG | SYSTOLIC BLOOD PRESSURE: 102 MMHG | WEIGHT: 271.6 LBS | OXYGEN SATURATION: 96 % | RESPIRATION RATE: 16 BRPM

## 2018-09-11 DIAGNOSIS — R06.02 SOB (SHORTNESS OF BREATH): ICD-10-CM

## 2018-09-11 DIAGNOSIS — R09.81 NASAL CONGESTION WITH RHINORRHEA: ICD-10-CM

## 2018-09-11 DIAGNOSIS — J40 BRONCHITIS: ICD-10-CM

## 2018-09-11 DIAGNOSIS — R06.2 WHEEZE: ICD-10-CM

## 2018-09-11 DIAGNOSIS — R05.9 COUGH: ICD-10-CM

## 2018-09-11 DIAGNOSIS — J34.89 NASAL CONGESTION WITH RHINORRHEA: ICD-10-CM

## 2018-09-11 PROCEDURE — 94760 N-INVAS EAR/PLS OXIMETRY 1: CPT | Performed by: NURSE PRACTITIONER

## 2018-09-11 PROCEDURE — 71046 X-RAY EXAM CHEST 2 VIEWS: CPT

## 2018-09-11 PROCEDURE — 99214 OFFICE O/P EST MOD 30 MIN: CPT | Performed by: NURSE PRACTITIONER

## 2018-09-11 RX ORDER — METHYLPREDNISOLONE 4 MG/1
TABLET ORAL
Qty: 1 KIT | Refills: 0 | Status: SHIPPED | OUTPATIENT
Start: 2018-09-11 | End: 2018-11-30

## 2018-09-11 RX ORDER — IPRATROPIUM BROMIDE AND ALBUTEROL SULFATE 2.5; .5 MG/3ML; MG/3ML
3 SOLUTION RESPIRATORY (INHALATION) ONCE
Status: COMPLETED | OUTPATIENT
Start: 2018-09-11 | End: 2018-09-11

## 2018-09-11 RX ORDER — FLUTICASONE PROPIONATE 50 MCG
2 SPRAY, SUSPENSION (ML) NASAL DAILY
Qty: 1 BOTTLE | Refills: 0 | Status: CANCELLED | OUTPATIENT
Start: 2018-09-11

## 2018-09-11 RX ORDER — DOXYCYCLINE HYCLATE 100 MG
100 TABLET ORAL 2 TIMES DAILY
Qty: 20 TAB | Refills: 0 | Status: SHIPPED | OUTPATIENT
Start: 2018-09-11 | End: 2018-09-21

## 2018-09-11 RX ADMIN — IPRATROPIUM BROMIDE AND ALBUTEROL SULFATE 3 ML: 2.5; .5 SOLUTION RESPIRATORY (INHALATION) at 15:48

## 2018-09-11 ASSESSMENT — ENCOUNTER SYMPTOMS
SINUS PAIN: 1
ORTHOPNEA: 0
HEADACHES: 0
PALPITATIONS: 0
CHILLS: 0
WEAKNESS: 0
COUGH: 1
EYE REDNESS: 0
WHEEZING: 1
CONSTIPATION: 0
VOMITING: 0
DIARRHEA: 0
SORE THROAT: 1
MYALGIAS: 1
FEVER: 0
NAUSEA: 0
SPUTUM PRODUCTION: 1
ABDOMINAL PAIN: 0
DIZZINESS: 0
SHORTNESS OF BREATH: 1
EYE DISCHARGE: 0

## 2018-09-11 ASSESSMENT — PAIN SCALES - GENERAL: PAINLEVEL: 9=SEVERE PAIN

## 2018-09-11 NOTE — LETTER
September 11, 2018       Patient: Earlene Aggarwal   YOB: 1990   Date of Visit: 9/11/2018         To Whom It May Concern:    It is my medical opinion that Earlene Aggarwal be excused from work due to illness. May return on 9/17/18.    If you have any questions or concerns, please don't hesitate to call [phone]          Sincerely,          CHAYITO MoralesN.P.  Electronically Signed

## 2018-09-11 NOTE — PROGRESS NOTES
"Subjective:      Earlene Aggarwal is a 28 y.o. female who presents with Cough (Fatigue, meds given not working)            HPI  Earlene is here for cough, SOB, wheeze, fatigue, \"feverish\". Was seen in UC 3 days ago for same thing. States cough syrup/pills not working. Nebulizer once this AM, none during work today. Over weekend,  Nebulizer used 4x/day. C/o intermittent productive, brown/green phlegm and dry cough. Nasal d/c: dark yellow. Son had PNA 3 weeks ago. Taking OTC Dayquil, Terra seltzer cold/flu, nasal d/c med, Nyquil x 5 days. Severe bronchospasm that causes her to gag. Loss of voice. Denies smoking or asthma.    PMH:  has a past medical history of IBS (irritable bowel syndrome); Insomnia (12/8/2015); Migraine; MRSA (methicillin resistant Staphylococcus aureus) infection (11/08); Pyelonephritis; Severe cervical dysplasia, histologically confirmed; and Vision changes (12/8/2015).  MEDS:   Current Outpatient Prescriptions:   •  guaifenesin-codeine (CHERATUSSIN AC) Solution oral solution, Take 5 mL by mouth every 6 hours as needed for Cough for up to 4 days., Disp: 75 mL, Rfl: 0  •  benzonatate (TESSALON) 100 MG Cap, Take 1 Cap by mouth 3 times a day as needed for up to 5 days., Disp: 15 Cap, Rfl: 0  •  albuterol (PROVENTIL) 2.5mg/3ml Nebu Soln solution for nebulization, 3 mL by Nebulization route every four hours as needed for Shortness of Breath for up to 10 days., Disp: 30 Bullet, Rfl: 0  •  acetaminophen/caffeine/butalbital 325-40-50 mg (FIORICET) -40 MG Tab, Take 1 Tab by mouth every four hours as needed for Headache (No more than 2days a week, no more than 6 tabs in 24h)., Disp: 30 Tab, Rfl: 0  •  albuterol 108 (90 BASE) MCG/ACT Aero Soln inhalation aerosol, Inhale 2 Puffs by mouth every 6 hours as needed for Shortness of Breath., Disp: 8.5 g, Rfl: 1  •  ondansetron (ZOFRAN ODT) 4 MG TABLET DISPERSIBLE, Take 1 Tab by mouth every 8 hours as needed for Nausea/Vomiting., Disp: 10 Tab, Rfl: 0  •  " sumatriptan (IMITREX) 100 MG tablet, Take 1 Tab by mouth Once PRN for Migraine for up to 1 dose., Disp: 10 Tab, Rfl: 3  •  azithromycin (ZITHROMAX) 250 MG Tab, Take two tablets by mouth today, then one tablet by mouth daily until finished, Disp: 6 Tab, Rfl: 0  •  phentermine 37.5 MG capsule, Take 1 Cap by mouth every morning., Disp: 30 Cap, Rfl: 0  •  zolpidem (AMBIEN) 5 MG Tab, Take 1 Tab by mouth at bedtime as needed for Sleep., Disp: 30 Tab, Rfl: 0  •  metoclopramide (REGLAN) 10 MG Tab, Take 1 Tab by mouth 4 times a day as needed (nausea headache)., Disp: 20 Tab, Rfl: 0  •  methocarbamol (ROBAXIN-750) 750 MG Tab, Take 2 Tabs by mouth 3 times a day., Disp: 40 Tab, Rfl: 0  •  promethazine (PHENERGAN) 25 MG Tab, Take 1 Tab by mouth every 6 hours as needed for Nausea/Vomiting., Disp: 10 Tab, Rfl: 0  •  ibuprofen (MOTRIN) 200 MG Tab, Take 200 mg by mouth every 6 hours as needed. Indications: pt states that she was taking about 6 at a time, Disp: , Rfl:   •  Eluxadoline 75 MG Tab, Take 1 tablet by mouth 2 times a day., Disp: 60 Tab, Rfl: 0  •  rizatriptan (MAXALT-MLT) 10 MG disintegrating tablet, Take 1 Tab by mouth Once PRN for Migraine for up to 1 dose., Disp: 10 Tab, Rfl: 3  ALLERGIES:   Allergies   Allergen Reactions   • Nkda [No Known Drug Allergy]      SURGHX:   Past Surgical History:   Procedure Laterality Date   • HYSTEROSCOPY THERMAL ABLATION N/A 8/20/2016    Procedure: HYSTEROSCOPY THERMAL ABLATION;  Surgeon: Bill Lane M.D.;  Location: [facility];  Service:    • MAMMOPLASTY REDUCTION  8/29/2012    Performed by VIVIEN HERRERA at SURGERY [facility] ORS   • TUBAL COAGULATION LAPAROSCOPIC BILATERAL  12/27/2011    Performed by BILL LANE at [facility] ORS   • CASSANDRA BY LAPAROSCOPY  9/7/2011    Performed by RHONA JOAQUIN at SURGERY [facility] ORS   • DILATION AND CURETTAGE CONE BIOPSY  2008    Dr Savage   • TONSILLECTOMY AND ADENOIDECTOMY  1999   • EAR MIDDLE EXPLORATION    "   ear tubes   • OTHER      tonsillectomy     SOCHX:  reports that she has never smoked. She has never used smokeless tobacco. She reports that she drinks about 1.2 oz of alcohol per week . She reports that she does not use drugs.  FH: Family history was reviewed, no pertinent findings to report    Review of Systems   Constitutional: Positive for malaise/fatigue. Negative for chills and fever.   HENT: Positive for congestion, ear pain, sinus pain and sore throat.    Eyes: Negative for discharge and redness.   Respiratory: Positive for cough, sputum production, shortness of breath and wheezing.    Cardiovascular: Negative for chest pain, palpitations and orthopnea.   Gastrointestinal: Negative for abdominal pain, constipation, diarrhea, nausea and vomiting.   Musculoskeletal: Positive for myalgias.   Neurological: Negative for dizziness, weakness and headaches.   All other systems reviewed and are negative.         Objective:     /60   Pulse 96   Temp 36.7 °C (98.1 °F)   Resp 16   Ht 1.6 m (5' 3\")   Wt 123.2 kg (271 lb 9.6 oz)   SpO2 96%   BMI 48.11 kg/m²      Physical Exam   Constitutional: She is oriented to person, place, and time. Vital signs are normal. She appears well-developed and well-nourished. She is active and cooperative.  Non-toxic appearance. She does not have a sickly appearance. She appears ill. No distress.   HENT:   Head: Normocephalic.   Right Ear: External ear and ear canal normal. Tympanic membrane is injected.   Left Ear: External ear and ear canal normal. Tympanic membrane is injected.   Nose: Mucosal edema and rhinorrhea present. No sinus tenderness.   Mouth/Throat: Uvula is midline. Mucous membranes are dry. No uvula swelling. Posterior oropharyngeal erythema present.   Eyes: Pupils are equal, round, and reactive to light. Conjunctivae and EOM are normal.   Neck: Normal range of motion. Neck supple.   Cardiovascular: Normal rate and regular rhythm.    Pulmonary/Chest: Effort " normal. No accessory muscle usage. No respiratory distress. She has no decreased breath sounds. She has wheezes. She has no rhonchi. She has no rales.   Musculoskeletal: Normal range of motion.   Lymphadenopathy:     She has no cervical adenopathy.   Neurological: She is alert and oriented to person, place, and time.   Skin: Skin is warm and dry. She is not diaphoretic.   Vitals reviewed.  Duo neb Albuterol breathing treatment: Patient has chest tightness, SOB, bronchopsasm without CP. Cough induced especially with deep breathing. After, patient states able to breathe deeper without bronchospasm. Air movement throughout. Post tx 96%.    CXR:    FINDINGS:  The heart is upper normal in size.  No pulmonary infiltrates or consolidations are noted.  No pleural effusions are appreciated.       Assessment/Plan:     1. Cough    - DX-CHEST-2 VIEWS; Future  - ipratropium-albuterol (DUONEB) nebulizer solution; 3 mL by Nebulization route Once.    2. SOB (shortness of breath)    - DX-CHEST-2 VIEWS; Future  - ipratropium-albuterol (DUONEB) nebulizer solution; 3 mL by Nebulization route Once.    3. Wheeze    - MethylPREDNISolone (MEDROL DOSEPAK) 4 MG Tablet Therapy Pack; Use as directed  Dispense: 1 Kit; Refill: 0    4. Nasal congestion with rhinorrhea      5. Bronchitis    - doxycycline (VIBRAMYCIN) 100 MG Tab; Take 1 Tab by mouth 2 times a day for 10 days.  Dispense: 20 Tab; Refill: 0    D/c OTC meds aforementioned at this time  Increase water intake  May use Ibuprofen/Tylenol prn for fever or body aches  Get rest  May use daily longer acting antihistamine prn for allergy like symptoms  May use saline nasal spray prn to flush nasal congestion  Use nebulizer prn for SOB, wheeze, bronchospasm with cough  May continue prescribed cough meds prn  Invest in humidifier for dry hacking cough  Monitor for fevers, productive cough, SOB, CP, chest tightness- need re-evaluation

## 2018-11-30 ENCOUNTER — OFFICE VISIT (OUTPATIENT)
Dept: MEDICAL GROUP | Facility: PHYSICIAN GROUP | Age: 28
End: 2018-11-30
Payer: COMMERCIAL

## 2018-11-30 VITALS
DIASTOLIC BLOOD PRESSURE: 84 MMHG | SYSTOLIC BLOOD PRESSURE: 124 MMHG | BODY MASS INDEX: 47.84 KG/M2 | OXYGEN SATURATION: 97 % | HEART RATE: 67 BPM | TEMPERATURE: 97.7 F | HEIGHT: 63 IN | RESPIRATION RATE: 18 BRPM | WEIGHT: 270 LBS

## 2018-11-30 DIAGNOSIS — G43.101 MIGRAINE WITH AURA AND WITH STATUS MIGRAINOSUS, NOT INTRACTABLE: ICD-10-CM

## 2018-11-30 DIAGNOSIS — E66.01 MORBID OBESITY WITH BMI OF 40.0-44.9, ADULT (HCC): ICD-10-CM

## 2018-11-30 DIAGNOSIS — K58.9 IRRITABLE BOWEL SYNDROME, UNSPECIFIED TYPE: ICD-10-CM

## 2018-11-30 DIAGNOSIS — L70.9 ACNE, UNSPECIFIED ACNE TYPE: ICD-10-CM

## 2018-11-30 DIAGNOSIS — G43.109 MIGRAINE WITH AURA AND WITHOUT STATUS MIGRAINOSUS, NOT INTRACTABLE: ICD-10-CM

## 2018-11-30 PROCEDURE — 99204 OFFICE O/P NEW MOD 45 MIN: CPT | Performed by: INTERNAL MEDICINE

## 2018-11-30 RX ORDER — ERYTHROMYCIN AND BENZOYL PEROXIDE 30; 50 MG/G; MG/G
GEL TOPICAL
Qty: 30 G | Refills: 0 | Status: SHIPPED | OUTPATIENT
Start: 2018-11-30 | End: 2019-02-23

## 2018-11-30 RX ORDER — SUMATRIPTAN 100 MG/1
100 TABLET, FILM COATED ORAL
Qty: 10 TAB | Refills: 3 | Status: SHIPPED | OUTPATIENT
Start: 2018-11-30 | End: 2021-12-20

## 2018-11-30 ASSESSMENT — PATIENT HEALTH QUESTIONNAIRE - PHQ9: CLINICAL INTERPRETATION OF PHQ2 SCORE: 0

## 2018-11-30 NOTE — PROGRESS NOTES
PRIMARY CARE CLINIC NEW PATIENT H&P  Chief Complaint   Patient presents with   • Migraine   • Stress     History of Present Illness     Migraine  Has tried several different medications in the past. The only thing that works for her is imitrex 100 mg as needed. Her headache frequency is high. Has seen the eye doctor and her prescription is up to date. Has been having more breakouts recently. Topamax gave her a severe headache that required hospitalization. Also takes ibuprofen as needed. Migraines are associated with visual aura in her periphery and also has neck tension/stiffness, nausea, photophobia, phonophobia.     Morbid obesity with BMI of 40.0-44.9, adult  Was on track for gastric sleeve but couldn't get a psych evaluation scheduled with [facility].     IBS (irritable bowel syndrome)  Diarrhea predominant. Sometimes does have mucous in her stools. Has been evaluated by a gastroenterologist in the past. Some of her issues have resolved since cholecystectomy but she has dumping syndrome. Has tried Viberzi which didn't help and it wasn't covered by her insurance either.     Acne  Has been noting breakouts on her face and body. Has boils on the inside of her thighs. Thinks she may have ingrown hairs that turn into boils.     Current Outpatient Prescriptions   Medication Sig Dispense Refill   • benzoyl peroxide-erythromycin (BENZAMYCIN) gel Apply twice daily after washing face with a gentle cleanser 30 g 0   • sumatriptan (IMITREX) 100 MG tablet Take 1 Tab by mouth Once PRN for Migraine for up to 1 dose. 10 Tab 3   • albuterol 108 (90 BASE) MCG/ACT Aero Soln inhalation aerosol Inhale 2 Puffs by mouth every 6 hours as needed for Shortness of Breath. 8.5 g 1   • ibuprofen (MOTRIN) 200 MG Tab Take 200 mg by mouth every 6 hours as needed. Indications: pt states that she was taking about 6 at a time       No current facility-administered medications for this visit.      Past Medical History:   Diagnosis Date   •  IBS (irritable bowel syndrome)    • Migraine    • Severe cervical dysplasia, histologically confirmed     hpv     Past Surgical History:   Procedure Laterality Date   • HYSTEROSCOPY THERMAL ABLATION N/A 8/20/2016    Procedure: HYSTEROSCOPY THERMAL ABLATION;  Surgeon: Bill Lane M.D.;  Location: [facility];  Service:    • MAMMOPLASTY REDUCTION  8/29/2012    Performed by VIVIEN HERRERA at SURGERY [facility]   • TUBAL COAGULATION LAPAROSCOPIC BILATERAL  12/27/2011    Performed by BILL LANE at SURGERY [facility] ORS   • CASSANDRA BY LAPAROSCOPY  9/7/2011    Performed by RHONA JOAQUIN at SURGERY [facility]   • DILATION AND CURETTAGE CONE BIOPSY  2008    Dr Savage   • TONSILLECTOMY AND ADENOIDECTOMY  1999   • EAR MIDDLE EXPLORATION      ear tubes   • LEEP       Social History   Substance Use Topics   • Smoking status: Former Smoker     Years: 10.00     Quit date: 2014   • Smokeless tobacco: Former User      Comment: on and off for ten years    • Alcohol use No     Social History     Social History Narrative     for "[employer]"           Family History   Problem Relation Age of Onset   • Cancer Mother         cervical   • Diabetes Father    • Cancer Maternal Grandmother         lung   • Cancer Paternal Grandfather         melanoma   • Hypertension Unknown      Family Status   Relation Status   • Mo Alive   • Fa Alive        hepatitis C   • MGMo Alive   • PGFa Alive   • PGMo Alive        parkinsons   • Sis Alive   • Bro Alive   • MGFa Alive   • Sis Alive   • Son Alive   • Son Alive   • Unknown (Not Specified)   • Bro Alive     Allergies: Nkda [no known drug allergy]    ROS  Constitutional: Negative for fatigue/generalized weakness.   HEENT: Negative for  vision changes, hearing changes    Respiratory: Negative for shortness of breath  Cardiovascular: Negative for chest pain, palpitations  Gastrointestinal: Negative for blood in stool, constipation. Positive for  "diarrhea and mucous in stools as per HPI  Genitourinary: Negative for dysuria, polyuria  Musculoskeletal: Negative for myalgias, back pain, and joint pain.   Skin: Negative for rash  Neurological: Negative for numbness, tingling  Psychiatric/Behavioral: Negative for depression, anxiety       Objective   Blood pressure 124/84, pulse 67, temperature 36.5 °C (97.7 °F), temperature source Temporal, resp. rate 18, height 1.6 m (5' 3\"), weight 122.5 kg (270 lb), SpO2 97 %, not currently breastfeeding. Body mass index is 47.83 kg/m².    General: Alert, oriented. In no acute distress   HEET: EOMI, PERRL, conjunctiva non-injected, sclera non-icteric.  Nares patent with no significant congestion or drainage.  Marie pinnae, external auditory canals, TM pearly gray with normal light reflex bilaterally.Oral mucous membranes pink and moist with no lesions.  Neck: supple with no cervical, subclavicular lymphadenopathy, JVD, palpable thyroid nodules   Lungs: clear to auscultation bilaterally with good excursion.  CV: regular rate and rhythm.  Abdomen soft, non-distended, non-tender with normal bowel sounds. No hepatosplenomegaly, no masses palpated  Skin: no lesions. Warm, dry   Psychiatric: appropriate mood and affect     Assessment and Plan   The following treatment plan was discussed     1. Migraine with aura and with status migrainosus, not intractable  Counseled on lifestyle modifications (exercise, healthy diet, adequate sleep/hydration) to help reduce migraine frequency.   - REFERRAL TO NEUROLOGY    2. Morbid obesity with BMI of 40.0-44.9, adult ([facility])  Counseled on lifestyle modifications as above.     3. Irritable bowel syndrome, unspecified type  She will consider returning to gastroenterology down the line.     4. Acne, unspecified acne type  May try exfoliation of inner thigh for the boils she is describing. For the face advised a gentle cleanser and benzamycin.   - benzoyl peroxide-erythromycin (BENZAMYCIN) gel; Apply " twice daily after washing face with a gentle cleanser  Dispense: 30 g; Refill: 0    5. Migraine with aura and without status migrainosus, not intractable  - sumatriptan (IMITREX) 100 MG tablet; Take 1 Tab by mouth Once PRN for Migraine for up to 1 dose.  Dispense: 10 Tab; Refill: 3      Return in about 3 months (around 2/28/2019) for gyn visit/pap smear .    Health Maintenance      Health Maintenance Due   Topic Date Due   • PAP SMEAR  08/05/2018       Vic Plata MD  Internal Medicine  [facility]

## 2018-11-30 NOTE — ASSESSMENT & PLAN NOTE
Has tried several different medications in the past. The only thing that works for her is imitrex 100 mg as needed. Her headache frequency is high. Has seen the eye doctor and her prescription is up to date. Has been having more breakouts recently. Topamax gave her a severe headache that required hospitalization. Also takes ibuprofen as needed. Migraines are associated with visual aura in her periphery and also has neck tension/stiffness, nausea, photophobia, phonophobia.

## 2018-11-30 NOTE — ASSESSMENT & PLAN NOTE
Was on track for gastric sleeve but couldn't get a psych evaluation scheduled with [facility].

## 2018-11-30 NOTE — ASSESSMENT & PLAN NOTE
Diarrhea predominant. Sometimes does have mucous in her stools. Has been evaluated by a gastroenterologist in the past. Some of her issues have resolved since cholecystectomy but she has dumping syndrome. Has tried Viberzi which didn't help and it wasn't covered by her insurance either.

## 2018-11-30 NOTE — ASSESSMENT & PLAN NOTE
Has been noting breakouts on her face and body. Has boils on the inside of her thighs. Thinks she may have ingrown hairs that turn into boils.

## 2018-12-12 ENCOUNTER — APPOINTMENT (OUTPATIENT)
Dept: RADIOLOGY | Facility: MEDICAL CENTER | Age: 28
End: 2018-12-12
Attending: EMERGENCY MEDICINE
Payer: COMMERCIAL

## 2018-12-12 ENCOUNTER — HOSPITAL ENCOUNTER (EMERGENCY)
Facility: MEDICAL CENTER | Age: 28
End: 2018-12-12
Attending: EMERGENCY MEDICINE
Payer: COMMERCIAL

## 2018-12-12 VITALS
SYSTOLIC BLOOD PRESSURE: 131 MMHG | HEIGHT: 63 IN | WEIGHT: 265.43 LBS | OXYGEN SATURATION: 97 % | DIASTOLIC BLOOD PRESSURE: 88 MMHG | HEART RATE: 67 BPM | TEMPERATURE: 97.2 F | RESPIRATION RATE: 18 BRPM | BODY MASS INDEX: 47.03 KG/M2

## 2018-12-12 DIAGNOSIS — S29.019A STRAIN OF THORACIC REGION, INITIAL ENCOUNTER: ICD-10-CM
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
## 2018-12-12 LAB
ALBUMIN SERPL BCP-MCNC: 4.5 G/DL (ref 3.2–4.9)
ALBUMIN/GLOB SERPL: 1.3 G/DL
ALP SERPL-CCNC: 75 U/L (ref 30–99)
ALT SERPL-CCNC: 56 U/L (ref 2–50)
ANION GAP SERPL CALC-SCNC: 8 MMOL/L (ref 0–11.9)
AST SERPL-CCNC: 42 U/L (ref 12–45)
BILIRUB SERPL-MCNC: 0.6 MG/DL (ref 0.1–1.5)
BUN SERPL-MCNC: 10 MG/DL (ref 8–22)
CALCIUM SERPL-MCNC: 8.9 MG/DL (ref 8.4–10.2)
CHLORIDE SERPL-SCNC: 104 MMOL/L (ref 96–112)
CO2 SERPL-SCNC: 24 MMOL/L (ref 20–33)
CREAT SERPL-MCNC: 0.78 MG/DL (ref 0.5–1.4)
ERYTHROCYTE [DISTWIDTH] IN BLOOD BY AUTOMATED COUNT: 44.6 FL (ref 35.9–50)
GLOBULIN SER CALC-MCNC: 3.5 G/DL (ref 1.9–3.5)
GLUCOSE SERPL-MCNC: 87 MG/DL (ref 65–99)
HCT VFR BLD AUTO: 41.4 % (ref 37–47)
HGB BLD-MCNC: 13.3 G/DL (ref 12–16)
MCH RBC QN AUTO: 27.1 PG (ref 27–33)
MCHC RBC AUTO-ENTMCNC: 32.1 G/DL (ref 33.6–35)
MCV RBC AUTO: 84.3 FL (ref 81.4–97.8)
PLATELET # BLD AUTO: 301 K/UL (ref 164–446)
PMV BLD AUTO: 9.4 FL (ref 9–12.9)
POTASSIUM SERPL-SCNC: 3.3 MMOL/L (ref 3.6–5.5)
PROT SERPL-MCNC: 8 G/DL (ref 6–8.2)
RBC # BLD AUTO: 4.91 M/UL (ref 4.2–5.4)
SODIUM SERPL-SCNC: 136 MMOL/L (ref 135–145)
TROPONIN I SERPL-MCNC: <0.02 NG/ML (ref 0–0.04)
WBC # BLD AUTO: 12.6 K/UL (ref 4.8–10.8)

## 2018-12-12 PROCEDURE — 700117 HCHG RX CONTRAST REV CODE 255: Performed by: EMERGENCY MEDICINE

## 2018-12-12 PROCEDURE — 99285 EMERGENCY DEPT VISIT HI MDM: CPT

## 2018-12-12 PROCEDURE — 93005 ELECTROCARDIOGRAM TRACING: CPT | Performed by: EMERGENCY MEDICINE

## 2018-12-12 PROCEDURE — 71260 CT THORAX DX C+: CPT

## 2018-12-12 PROCEDURE — 96374 THER/PROPH/DIAG INJ IV PUSH: CPT

## 2018-12-12 PROCEDURE — 36415 COLL VENOUS BLD VENIPUNCTURE: CPT

## 2018-12-12 PROCEDURE — 96375 TX/PRO/DX INJ NEW DRUG ADDON: CPT

## 2018-12-12 PROCEDURE — 700111 HCHG RX REV CODE 636 W/ 250 OVERRIDE (IP): Performed by: EMERGENCY MEDICINE

## 2018-12-12 PROCEDURE — 72131 CT LUMBAR SPINE W/O DYE: CPT

## 2018-12-12 PROCEDURE — 80053 COMPREHEN METABOLIC PANEL: CPT

## 2018-12-12 PROCEDURE — 72125 CT NECK SPINE W/O DYE: CPT

## 2018-12-12 PROCEDURE — 84484 ASSAY OF TROPONIN QUANT: CPT

## 2018-12-12 PROCEDURE — 72128 CT CHEST SPINE W/O DYE: CPT

## 2018-12-12 PROCEDURE — 85027 COMPLETE CBC AUTOMATED: CPT

## 2018-12-12 RX ORDER — MORPHINE SULFATE 4 MG/ML
4 INJECTION, SOLUTION INTRAMUSCULAR; INTRAVENOUS ONCE
Status: COMPLETED | OUTPATIENT
Start: 2018-12-12 | End: 2018-12-12

## 2018-12-12 RX ORDER — KETOROLAC TROMETHAMINE 30 MG/ML
15 INJECTION, SOLUTION INTRAMUSCULAR; INTRAVENOUS ONCE
Status: COMPLETED | OUTPATIENT
Start: 2018-12-12 | End: 2018-12-12

## 2018-12-12 RX ORDER — CYCLOBENZAPRINE HCL 5 MG
5-10 TABLET ORAL 3 TIMES DAILY PRN
Qty: 20 TAB | Refills: 0 | Status: SHIPPED | OUTPATIENT
Start: 2018-12-12 | End: 2019-02-23

## 2018-12-12 RX ADMIN — IOHEXOL 100 ML: 350 INJECTION, SOLUTION INTRAVENOUS at 19:00

## 2018-12-12 RX ADMIN — KETOROLAC TROMETHAMINE 15 MG: 30 INJECTION, SOLUTION INTRAMUSCULAR at 19:43

## 2018-12-12 RX ADMIN — MORPHINE SULFATE 4 MG: 4 INJECTION INTRAVENOUS at 18:12

## 2018-12-12 ASSESSMENT — ENCOUNTER SYMPTOMS
SHORTNESS OF BREATH: 0
FOCAL WEAKNESS: 0
ABDOMINAL PAIN: 0
SORE THROAT: 0
FLANK PAIN: 0
RESPIRATORY NEGATIVE: 1
CONSTITUTIONAL NEGATIVE: 1
NECK PAIN: 0
EYE PAIN: 0
MYALGIAS: 0
BLOOD IN STOOL: 0
SEIZURES: 0
GASTROINTESTINAL NEGATIVE: 1
FEVER: 0
WEAKNESS: 0
HALLUCINATIONS: 0
BRUISES/BLEEDS EASILY: 0
EYES NEGATIVE: 1
HEADACHES: 0
BACK PAIN: 1

## 2018-12-12 ASSESSMENT — PAIN SCALES - GENERAL
PAINLEVEL_OUTOF10: 5
PAINLEVEL_OUTOF10: 7

## 2018-12-12 NOTE — LETTER
[facility], EMERGENCY DEPT  [address]  [place] [address]  [phone]     December 12, 2018    Patient: Earlene Aggarwal   YOB: 1990   Date of Visit: 12/12/2018       To Whom It May Concern:    Earlene Aggarwal was seen and treated in our department on 12/12/2018. Please excuse from work on 12/13 and 12/14.    Sincerely,     Kelton Brown M.D.
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
## 2018-12-13 NOTE — ED NOTES
Pt feeling much better after IV med  Pt cleared for d/c  dischg instructions given to pt  Verbally understands  D/c'ed to home in NAD w/ Rx flexeril given and will fill and take as prescribed
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
## 2018-12-13 NOTE — ED NOTES
PIV established and blood to the lab, medicated per order and the plan of care was thoroughly discussed by ERP.

## 2018-12-28 LAB — EKG IMPRESSION: NORMAL

## 2019-02-23 ENCOUNTER — HOSPITAL ENCOUNTER (EMERGENCY)
Facility: MEDICAL CENTER | Age: 29
End: 2019-02-23
Attending: EMERGENCY MEDICINE
Payer: COMMERCIAL

## 2019-02-23 ENCOUNTER — APPOINTMENT (OUTPATIENT)
Dept: RADIOLOGY | Facility: MEDICAL CENTER | Age: 29
End: 2019-02-23
Attending: EMERGENCY MEDICINE
Payer: COMMERCIAL

## 2019-02-23 VITALS
BODY MASS INDEX: 46.88 KG/M2 | OXYGEN SATURATION: 97 % | WEIGHT: 264.55 LBS | DIASTOLIC BLOOD PRESSURE: 81 MMHG | TEMPERATURE: 97.7 F | HEART RATE: 61 BPM | HEIGHT: 63 IN | RESPIRATION RATE: 16 BRPM | SYSTOLIC BLOOD PRESSURE: 128 MMHG

## 2019-02-23 DIAGNOSIS — R10.2 PELVIC PAIN: ICD-10-CM

## 2019-02-23 DIAGNOSIS — N83.202 CYST OF LEFT OVARY: ICD-10-CM

## 2019-02-23 LAB
ALBUMIN SERPL BCP-MCNC: 4 G/DL (ref 3.2–4.9)
ALBUMIN/GLOB SERPL: 1.3 G/DL
ALP SERPL-CCNC: 65 U/L (ref 30–99)
ALT SERPL-CCNC: 22 U/L (ref 2–50)
ANION GAP SERPL CALC-SCNC: 6 MMOL/L (ref 0–11.9)
APPEARANCE UR: CLEAR
AST SERPL-CCNC: 18 U/L (ref 12–45)
BASOPHILS # BLD AUTO: 0.4 % (ref 0–1.8)
BASOPHILS # BLD: 0.04 K/UL (ref 0–0.12)
BILIRUB SERPL-MCNC: 0.4 MG/DL (ref 0.1–1.5)
BILIRUB UR QL STRIP.AUTO: NEGATIVE
BUN SERPL-MCNC: 12 MG/DL (ref 8–22)
CALCIUM SERPL-MCNC: 9.2 MG/DL (ref 8.5–10.5)
CHLORIDE SERPL-SCNC: 108 MMOL/L (ref 96–112)
CO2 SERPL-SCNC: 24 MMOL/L (ref 20–33)
COLOR UR: YELLOW
CREAT SERPL-MCNC: 0.7 MG/DL (ref 0.5–1.4)
EOSINOPHIL # BLD AUTO: 0.15 K/UL (ref 0–0.51)
EOSINOPHIL NFR BLD: 1.5 % (ref 0–6.9)
ERYTHROCYTE [DISTWIDTH] IN BLOOD BY AUTOMATED COUNT: 49.5 FL (ref 35.9–50)
GLOBULIN SER CALC-MCNC: 3 G/DL (ref 1.9–3.5)
GLUCOSE SERPL-MCNC: 84 MG/DL (ref 65–99)
GLUCOSE UR STRIP.AUTO-MCNC: NEGATIVE MG/DL
HCT VFR BLD AUTO: 38.4 % (ref 37–47)
HGB BLD-MCNC: 11.9 G/DL (ref 12–16)
IMM GRANULOCYTES # BLD AUTO: 0.02 K/UL (ref 0–0.11)
IMM GRANULOCYTES NFR BLD AUTO: 0.2 % (ref 0–0.9)
KETONES UR STRIP.AUTO-MCNC: NEGATIVE MG/DL
LEUKOCYTE ESTERASE UR QL STRIP.AUTO: NEGATIVE
LIPASE SERPL-CCNC: 20 U/L (ref 11–82)
LYMPHOCYTES # BLD AUTO: 2.55 K/UL (ref 1–4.8)
LYMPHOCYTES NFR BLD: 25.4 % (ref 22–41)
MCH RBC QN AUTO: 27.7 PG (ref 27–33)
MCHC RBC AUTO-ENTMCNC: 31 G/DL (ref 33.6–35)
MCV RBC AUTO: 89.5 FL (ref 81.4–97.8)
MICRO URNS: NORMAL
MONOCYTES # BLD AUTO: 0.37 K/UL (ref 0–0.85)
MONOCYTES NFR BLD AUTO: 3.7 % (ref 0–13.4)
NEUTROPHILS # BLD AUTO: 6.91 K/UL (ref 2–7.15)
NEUTROPHILS NFR BLD: 68.8 % (ref 44–72)
NITRITE UR QL STRIP.AUTO: NEGATIVE
NRBC # BLD AUTO: 0 K/UL
NRBC BLD-RTO: 0 /100 WBC
PH UR STRIP.AUTO: 5 [PH]
PLATELET # BLD AUTO: 324 K/UL (ref 164–446)
PMV BLD AUTO: 9.5 FL (ref 9–12.9)
POTASSIUM SERPL-SCNC: 4.1 MMOL/L (ref 3.6–5.5)
PROT SERPL-MCNC: 7 G/DL (ref 6–8.2)
PROT UR QL STRIP: NEGATIVE MG/DL
RBC # BLD AUTO: 4.29 M/UL (ref 4.2–5.4)
RBC UR QL AUTO: NEGATIVE
SODIUM SERPL-SCNC: 138 MMOL/L (ref 135–145)
SP GR UR STRIP.AUTO: 1.02
UROBILINOGEN UR STRIP.AUTO-MCNC: 0.2 MG/DL
WBC # BLD AUTO: 10 K/UL (ref 4.8–10.8)

## 2019-02-23 PROCEDURE — 99285 EMERGENCY DEPT VISIT HI MDM: CPT

## 2019-02-23 PROCEDURE — 96374 THER/PROPH/DIAG INJ IV PUSH: CPT

## 2019-02-23 PROCEDURE — 94760 N-INVAS EAR/PLS OXIMETRY 1: CPT

## 2019-02-23 PROCEDURE — 80053 COMPREHEN METABOLIC PANEL: CPT

## 2019-02-23 PROCEDURE — 83690 ASSAY OF LIPASE: CPT

## 2019-02-23 PROCEDURE — 36415 COLL VENOUS BLD VENIPUNCTURE: CPT

## 2019-02-23 PROCEDURE — 96375 TX/PRO/DX INJ NEW DRUG ADDON: CPT

## 2019-02-23 PROCEDURE — 74177 CT ABD & PELVIS W/CONTRAST: CPT

## 2019-02-23 PROCEDURE — 85025 COMPLETE CBC W/AUTO DIFF WBC: CPT

## 2019-02-23 PROCEDURE — 700111 HCHG RX REV CODE 636 W/ 250 OVERRIDE (IP): Performed by: EMERGENCY MEDICINE

## 2019-02-23 PROCEDURE — 700117 HCHG RX CONTRAST REV CODE 255: Performed by: EMERGENCY MEDICINE

## 2019-02-23 PROCEDURE — 81003 URINALYSIS AUTO W/O SCOPE: CPT

## 2019-02-23 PROCEDURE — 96376 TX/PRO/DX INJ SAME DRUG ADON: CPT

## 2019-02-23 RX ORDER — ONDANSETRON 2 MG/ML
4 INJECTION INTRAMUSCULAR; INTRAVENOUS ONCE
Status: COMPLETED | OUTPATIENT
Start: 2019-02-23 | End: 2019-02-23

## 2019-02-23 RX ORDER — HYDROMORPHONE HYDROCHLORIDE 1 MG/ML
0.5 INJECTION, SOLUTION INTRAMUSCULAR; INTRAVENOUS; SUBCUTANEOUS
Status: DISCONTINUED | OUTPATIENT
Start: 2019-02-23 | End: 2019-02-23 | Stop reason: HOSPADM

## 2019-02-23 RX ORDER — KETOROLAC TROMETHAMINE 30 MG/ML
30 INJECTION, SOLUTION INTRAMUSCULAR; INTRAVENOUS ONCE
Status: COMPLETED | OUTPATIENT
Start: 2019-02-23 | End: 2019-02-23

## 2019-02-23 RX ADMIN — KETOROLAC TROMETHAMINE 30 MG: 30 INJECTION, SOLUTION INTRAMUSCULAR at 15:01

## 2019-02-23 RX ADMIN — IOHEXOL 100 ML: 350 INJECTION, SOLUTION INTRAVENOUS at 13:17

## 2019-02-23 RX ADMIN — HYDROMORPHONE HYDROCHLORIDE 0.5 MG: 1 INJECTION, SOLUTION INTRAMUSCULAR; INTRAVENOUS; SUBCUTANEOUS at 11:10

## 2019-02-23 RX ADMIN — ONDANSETRON 4 MG: 2 INJECTION INTRAMUSCULAR; INTRAVENOUS at 11:10

## 2019-02-23 RX ADMIN — HYDROMORPHONE HYDROCHLORIDE 0.5 MG: 1 INJECTION, SOLUTION INTRAMUSCULAR; INTRAVENOUS; SUBCUTANEOUS at 13:41

## 2019-02-23 ASSESSMENT — LIFESTYLE VARIABLES: DO YOU DRINK ALCOHOL: NO

## 2019-02-23 NOTE — ED NOTES
Pt tolerated IV start well, talking with staff. Pt educated on plan of care at this time. Call light in reach, side rails up x2, bed in lowest position, pt resting comfortably at this time.

## 2019-02-23 NOTE — ED TRIAGE NOTES
Pt amb to triage.  Chief Complaint   Patient presents with   • Abdominal Pain     low x1wk, crampy   • Nausea   • Painful Urination     Pt given urine collection supplies. Instructed on clean catch technique.

## 2019-02-23 NOTE — ED NOTES
Pain reassessd, to re administer analgesia per MAR if pain gets worse. Pt educated on plan of care at this time. Call light in reach, side rails up x2, bed in lowest position, pt resting comfortably at this time.

## 2019-02-23 NOTE — ED NOTES
PT given meds per mar.  Tolerated well, resting at this time. siderails up x2, call light within reach.

## 2019-02-23 NOTE — ED NOTES
PT given meds per mar.  Tolerated well, resting at this time. siderails up x2, call light within reach. To reassess pain following med admin in 30 min

## 2019-02-23 NOTE — ED PROVIDER NOTES
ED Provider Note    Scribed for Yared Sawyer M.D. by Pa Mendoza. 2/23/2019  10:45 AM    Primary care provider: Vic Plata M.D.  Means of arrival: Walk-in  History obtained from: Patient  History limited by: None    CHIEF COMPLAINT  Chief Complaint   Patient presents with   • Abdominal Pain     low x1wk, crampy   • Nausea   • Painful Urination       HPI  Earlene Aggarwal is a 28 y.o. female who presents to the Emergency Department complaining of cramping, sharp, and shooting abdominal pain that began one week ago. The pain seemed to subside, but worsened in severity this morning. The pain is exacerbated by bowel movements and urinating. Her abdomen is tender to palpation. Patient complains of associated nausea and vomiting. She had mild vomiting yesterday evening. She denies constipation, dysuria, vaginal discharge, or vaginal bleeding. Her last normal menstrual period was three years ago. In 2011 she underwent a tubal ligation causing heavy menstrual bleeding. He underwent a subsequent thermal ablation of her uterus and she has not experienced menstrual bleeding since. She experiences other symptoms of her menstrual cycle and she last experienced these symptoms two weeks ago. There is no chance she is pregnant. She reports a history of IBS and experiences diarrhea at baseline.    REVIEW OF SYSTEMS  Pertinent positives include abdominal pain, nausea and vomiting. Pertinent negatives include no constipation, dysuria, vaginal discharge, or vaginal bleeding.  All other systems reviewed and negative.    PAST MEDICAL HISTORY   has a past medical history of IBS (irritable bowel syndrome); Migraine; and Severe cervical dysplasia, histologically confirmed.    SURGICAL HISTORY   has a past surgical history that includes tonsillectomy and adenoidectomy (1999); ear middle exploration; dilation and curettage cone biopsy (2008); joshua by laparoscopy (9/7/2011); tubal coagulation laparoscopic bilateral  "(12/27/2011); mammoplasty reduction (8/29/2012); hysteroscopy thermal ablation (N/A, 8/20/2016); and leep.    SOCIAL HISTORY  Social History   Substance Use Topics   • Smoking status: Former Smoker     Years: 10.00     Quit date: 2014   • Smokeless tobacco: Former User      Comment: on and off for ten years    • Alcohol use No      History   Drug Use   • Types: Marijuana       FAMILY HISTORY  Family History   Problem Relation Age of Onset   • Cancer Mother         cervical   • Diabetes Father    • Cancer Maternal Grandmother         lung   • Cancer Paternal Grandfather         melanoma   • Hypertension Unknown        CURRENT MEDICATIONS  Home Medications     Reviewed by Yves Iraheta R.N. (Registered Nurse) on 02/23/19 at 0939  Med List Status: Partial   Medication Last Dose Status   ibuprofen (MOTRIN) 200 MG Tab  Active   sumatriptan (IMITREX) 100 MG tablet prn Active                ALLERGIES  Allergies   Allergen Reactions   • Nkda [No Known Drug Allergy]        PHYSICAL EXAM  VITAL SIGNS: /81   Pulse 78   Temp 36.5 °C (97.7 °F) (Temporal)   Resp 16   Ht 1.6 m (5' 3\")   Wt 120 kg (264 lb 8.8 oz)   SpO2 97%   BMI 46.86 kg/m²     Constitutional: Well developed, Well nourished, Moderate distress, Non-toxic appearance. Morbidly obese  HENT: Normocephalic, Atraumatic, Bilateral external ears normal, Oropharynx moist, No oral exudates.   Eyes: PERRLA, EOMI, Conjunctiva normal, No discharge.   Neck: No tenderness, Supple, No stridor.   Lymphatic: No lymphadenopathy noted.   Cardiovascular: Normal heart rate, Normal rhythm.   Thorax & Lungs: Clear to auscultation bilaterally, No respiratory distress, No wheezing, No crackles.   Abdomen: Soft, Moderately diffuse tenderness throughout, more profound in the left lower quadrant. No masses, No pulsatile masses.   Skin: Warm, Dry, No erythema, No rash.   Extremities:, No edema No cyanosis.   Musculoskeletal: No tenderness to palpation or major deformities " noted.  Intact distal pulses  Neurologic: Awake, alert. Moves all extremities spontaneously.  Psychiatric: Affect normal, Judgment normal, Mood normal.    LABS  Labs Reviewed   CBC WITH DIFFERENTIAL - Abnormal; Notable for the following:        Result Value    Hemoglobin 11.9 (*)     MCHC 31.0 (*)     All other components within normal limits   COMP METABOLIC PANEL   LIPASE   URINALYSIS,CULTURE IF INDICATED   ESTIMATED GFR     All labs reviewed by me.    RADIOLOGY  CT-ABDOMEN-PELVIS WITH   Final Result      Enlarged left ovary with apparent left ovarian cysts.      Otherwise unremarkable CT scan of the abdomen and pelvis.        The radiologist's interpretation of all radiological studies have been reviewed by me.      COURSE & MEDICAL DECISION MAKING  Pertinent Labs & Imaging studies reviewed. (See chart for details)    10:45 AM - Patient seen and examined at bedside. Patient will be treated with ondansetron injection 4 mg and hydromorphone injection 0.5 mg. Ordered CT abdomen pelvis with contrast, estimated GFR, CBC with differential, CMP, lipase, and U/A culture if indicated to evaluate her symptoms. The differential diagnoses include but are not limited to: Divercitulus, ovarian cyst, fibroid    2:41 PM - Re-examined; The patient is resting in bed and complains of some persistent pain. I discussed her above findings and plans for discharge. She was given a referral to Dr. Plata and instructed to return to the ED if her symptoms worsen. Patient understands and agrees.    2:43 PM I ordered ketorolac injection 30 mg.    Decision Making:  Patient with lower abdominal pain is fairly tender on examination especially left lower quadrant, laboratory tests are unremarkable, CT scan shows a left ovarian cyst.  Discussed with her about the ovarian cyst take Tylenol and ibuprofen Profen for the pain, follow-up with her gynecologist, have the patient return with any other concerns.    HTN/IDDM FOLLOW UP:  The patient is  referred to a primary physician for blood pressure management, diabetic screening, and for all other preventive health concerns    The patient will return for new or worsening symptoms and is stable at the time of discharge.    DISPOSITION:  Patient will be discharged home in stable condition.    FOLLOW UP:  [facility], Emergency Dept  [address] [facility] [id number] [phone]        Vic Plata M.D.  [address] [place] [id number] [phone]          FINAL IMPRESSION  1. Cyst of left ovary    2. Pelvic pain          Pa LESTER (Scribe), am scribing for, and in the presence of, Yared Sawyer M.D..    Electronically signed by: Pa Mendoza (Scribe), 2/23/2019    IYared M.D. personally performed the services described in this documentation, as scribed by Pa Mendoza in my presence, and it is both accurate and complete. C    The note accurately reflects work and decisions made by me.  Yared Sawyer  2/23/2019  5:48 PM

## 2019-02-23 NOTE — ED NOTES
Pt states pain is decreased at this time with analgesia. educated on plan of care at this time. Call light in reach, side rails up x2, bed in lowest position, pt resting comfortably .

## 2019-03-04 ENCOUNTER — PATIENT MESSAGE (OUTPATIENT)
Dept: MEDICAL GROUP | Facility: PHYSICIAN GROUP | Age: 29
End: 2019-03-04

## 2019-03-05 NOTE — PATIENT COMMUNICATION
Either you can double book her for me Wednesday for one of my 40 minutes or she can be seen in urgent care since her symptoms are still persisting

## 2019-03-06 ENCOUNTER — OFFICE VISIT (OUTPATIENT)
Dept: MEDICAL GROUP | Facility: PHYSICIAN GROUP | Age: 29
End: 2019-03-06
Payer: COMMERCIAL

## 2019-03-06 VITALS
HEIGHT: 63 IN | OXYGEN SATURATION: 99 % | HEART RATE: 66 BPM | TEMPERATURE: 98.6 F | RESPIRATION RATE: 18 BRPM | WEIGHT: 268 LBS | BODY MASS INDEX: 47.48 KG/M2 | SYSTOLIC BLOOD PRESSURE: 132 MMHG | DIASTOLIC BLOOD PRESSURE: 90 MMHG

## 2019-03-06 DIAGNOSIS — N83.202 CYST OF LEFT OVARY: ICD-10-CM

## 2019-03-06 PROCEDURE — 99214 OFFICE O/P EST MOD 30 MIN: CPT | Performed by: INTERNAL MEDICINE

## 2019-03-06 ASSESSMENT — PATIENT HEALTH QUESTIONNAIRE - PHQ9: CLINICAL INTERPRETATION OF PHQ2 SCORE: 0

## 2019-03-06 NOTE — PROGRESS NOTES
PRIMARY CARE CLINIC FOLLOW UP VISIT  Chief Complaint   Patient presents with   • Ovarian Cyst     History of Present Illness     Cyst of left ovary  Had intercourse 2/23/2019 and had pain afterwards but not during intercourse. Had excruciating left lower abdominal pain after intercourse. She was in the ER recently. Even taking a step caused excruciating abdominal pain. CT a/p demonstrated enlarged left ovary and a cyst. Wasn't ovulating at that time. Continues to have left lower abdominal pain and is worse with urination, defecation, passing flatus. The increase in pressure that comes with pelvic floor contraction for the aforementioned functions worsens her pain. It is difficult for her to get in with her current gynecologist since that office is so busy. She has been using ibuprofen and heat but it only helps so much.     Current Outpatient Prescriptions   Medication Sig Dispense Refill   • sumatriptan (IMITREX) 100 MG tablet Take 1 Tab by mouth Once PRN for Migraine for up to 1 dose. 10 Tab 3   • ibuprofen (MOTRIN) 200 MG Tab Take 200 mg by mouth every 6 hours as needed. Indications: pt states that she was taking about 6 at a time       No current facility-administered medications for this visit.      Past Medical History:   Diagnosis Date   • IBS (irritable bowel syndrome)    • Migraine    • Severe cervical dysplasia, histologically confirmed     hpv     Past Surgical History:   Procedure Laterality Date   • HYSTEROSCOPY THERMAL ABLATION N/A 8/20/2016    Procedure: HYSTEROSCOPY THERMAL ABLATION;  Surgeon: Bill Dove M.D.;  Location: [facility];  Service:    • MAMMOPLASTY REDUCTION  8/29/2012    Performed by VIVIEN HERRERA at [facility] ORS   • TUBAL COAGULATION LAPAROSCOPIC BILATERAL  12/27/2011    Performed by BILL DOVE at [facility] ORS   • CASSANDRA BY LAPAROSCOPY  9/7/2011    Performed by RHONA JOAQUIN at [facility]   • DILATION AND CURETTAGE CONE  "BIOPSY  2008    Dr Savage   • TONSILLECTOMY AND ADENOIDECTOMY  1999   • EAR MIDDLE EXPLORATION      ear tubes   • LEEP       Social History   Substance Use Topics   • Smoking status: Former Smoker     Years: 10.00     Quit date: 2014   • Smokeless tobacco: Former User      Comment: on and off for ten years    • Alcohol use No     Social History     Social History Narrative     for Adea           Family History   Problem Relation Age of Onset   • Cancer Mother         cervical   • Diabetes Father    • Cancer Maternal Grandmother         lung   • Cancer Paternal Grandfather         melanoma   • Hypertension Unknown      Family Status   Relation Status   • Mo Alive   • Fa Alive        hepatitis C   • MGMo Alive   • PGFa Alive   • PGMo Alive        parkinsons   • Sis Alive   • Bro Alive   • MGFa Alive   • Sis Alive   • Son Alive   • Son Alive   • Unknown (Not Specified)   • Bro Alive     Allergies: Nkda [no known drug allergy]    ROS  As per HPI above. All other systems reviewed and negative.        Objective   Blood pressure 132/90, pulse 66, temperature 37 °C (98.6 °F), resp. rate 18, height 1.6 m (5' 3\"), weight 121.6 kg (268 lb), SpO2 99 %, not currently breastfeeding. Body mass index is 47.47 kg/m².    General: alert and oriented, appears in acute distress   HEENT: Normocephalic, atraumatic.   Gastrointestinal: tenderness to palpation: mild of right but more tender moving towards mid abdomen and of left lower quadrant. No hepatosplenomegaly. Bowel sounds normoactive  Skin: warm and dry, no lesions or rashes  Psychiatric: appropriate mood and affect. Good insight and appropriate judgment     Assessment and Plan   The following treatment plan was discussed     1. Cyst of left ovary  aErlene is in severe pain and has a left ovarian cyst that is likely the cause. Will check pelvic ultrasound but given urgent referral to gynecology as she needs cystectomy. CBC checked from ED and stable, not as " concerned for hemorrhagic process but will follow up ultrasound.   - US-PELVIC TRANSVAGINAL ONLY; Future  - REFERRAL TO GYNECOLOGY  - US-PELVIC TRANSVAGINAL ONLY; Future      Healthcare maintenance     Health Maintenance Due   Topic Date Due   • PAP SMEAR  08/05/2018       Return if symptoms worsen or fail to improve.    Vic Plata MD  Internal Medicine  [facility]

## 2019-03-06 NOTE — ASSESSMENT & PLAN NOTE
Had intercourse 2/23/2019 and had pain afterwards but not during intercourse. Had excruciating left lower abdominal pain after intercourse. She was in the ER recently. Even taking a step caused excruciating abdominal pain. CT a/p demonstrated enlarged left ovary and a cyst. Wasn't ovulating at that time. Continues to have left lower abdominal pain and is worse with urination, defecation, passing flatus. The increase in pressure that comes with pelvic floor contraction for the aforementioned functions worsens her pain. It is difficult for her to get in with her current gynecologist since that office is so busy. She has been using ibuprofen and heat but it only helps so much.

## 2019-03-07 ENCOUNTER — HOSPITAL ENCOUNTER (OUTPATIENT)
Dept: RADIOLOGY | Facility: MEDICAL CENTER | Age: 29
End: 2019-03-07
Attending: INTERNAL MEDICINE
Payer: COMMERCIAL

## 2019-03-07 DIAGNOSIS — N83.202 CYST OF LEFT OVARY: ICD-10-CM

## 2019-03-07 PROCEDURE — 76830 TRANSVAGINAL US NON-OB: CPT

## 2019-03-12 ENCOUNTER — TELEPHONE (OUTPATIENT)
Dept: MEDICAL GROUP | Facility: PHYSICIAN GROUP | Age: 29
End: 2019-03-12

## 2019-03-12 NOTE — TELEPHONE ENCOUNTER
Phone Number Called: [phone] (home)     Message: Left vm for Pt to call office back and ask for me.     Left Message for patient to call back: yes

## 2019-03-12 NOTE — TELEPHONE ENCOUNTER
----- Message from Earlene Aggarwal sent at 3/11/2019  9:43 AM PDT -----  Regarding: RE: Test Result Question  Contact: [phone]  Can you give me a call?     [facility] to see if I can be seen sooner but other than that they are scheduling 12 weeks out.     I feel like this is ridiculous.       ----- Message -----  From: Sowmya Waite, Med Ass't  Sent: 3/8/2019 11:54 AM PST  To: Earlene Aggarwal  Subject: RE: Test Result Question  Nilamfloyd earlene,     It likes theres two locations on your referral; [facility], [facility].   Which one are you wanting or were you requesting both to see who can get you in sooner?    Let me know  Sowmya     ----- Message -----     From: Earlene Aggarwal     Sent: 3/7/2019  5:38 PM PST       To: Vic Plata M.D.  Subject: Test Result Question    Hello,     I called today and they have not gotten the referral as of 945 this am.     I will call again tomorrow.  They did tell me depending on how the referral was fillled out will determine how soon I get seen and currently they’re scheduling 12 weeks out.     I’m really hoping I can get in sooner rather than later especially with how I’m feeling currently.     Thanks   Earlene

## 2019-03-27 ENCOUNTER — HOSPITAL ENCOUNTER (OUTPATIENT)
Facility: MEDICAL CENTER | Age: 29
End: 2019-03-27
Payer: COMMERCIAL

## 2019-03-27 LAB
BDY FAT % MEASURED: 52.4 %
BP DIAS: 82 MMHG
BP SYS: 118 MMHG
CHOLEST SERPL-MCNC: 199 MG/DL (ref 100–199)
DIABETES HTDIA: NO
EVENT NAME HTEVT: NORMAL
FASTING STATUS PATIENT QL REPORTED: NORMAL
GLUCOSE SERPL-MCNC: 83 MG/DL (ref 65–99)
HDLC SERPL-MCNC: 49 MG/DL
HYPERTENSION HTHYP: NO
LDLC SERPL CALC-MCNC: 131 MG/DL
SCREENING LOC CITY HTCIT: NORMAL
SCREENING LOC STATE HTSTA: NORMAL
SCREENING LOCATION HTLOC: NORMAL
SUBSCRIBER ID HTSID: NORMAL
TRIGL SERPL-MCNC: 94 MG/DL (ref 0–149)

## 2019-05-16 ENCOUNTER — OFFICE VISIT (OUTPATIENT)
Dept: URGENT CARE | Facility: PHYSICIAN GROUP | Age: 29
End: 2019-05-16
Payer: COMMERCIAL

## 2019-05-16 VITALS
BODY MASS INDEX: 46.95 KG/M2 | OXYGEN SATURATION: 98 % | RESPIRATION RATE: 20 BRPM | DIASTOLIC BLOOD PRESSURE: 84 MMHG | SYSTOLIC BLOOD PRESSURE: 136 MMHG | TEMPERATURE: 98.6 F | WEIGHT: 265 LBS | HEIGHT: 63 IN | HEART RATE: 87 BPM

## 2019-05-16 DIAGNOSIS — K52.9 GASTROENTERITIS: ICD-10-CM

## 2019-05-16 PROCEDURE — 99214 OFFICE O/P EST MOD 30 MIN: CPT | Performed by: NURSE PRACTITIONER

## 2019-05-16 RX ORDER — ONDANSETRON 2 MG/ML
8 INJECTION INTRAMUSCULAR; INTRAVENOUS ONCE
Status: DISCONTINUED | OUTPATIENT
Start: 2019-05-16 | End: 2019-05-16

## 2019-05-16 RX ORDER — ONDANSETRON 2 MG/ML
4 INJECTION INTRAMUSCULAR; INTRAVENOUS ONCE
Status: COMPLETED | OUTPATIENT
Start: 2019-05-16 | End: 2019-05-16

## 2019-05-16 RX ORDER — ONDANSETRON 4 MG/1
4 TABLET, FILM COATED ORAL EVERY 6 HOURS PRN
Qty: 30 TAB | Refills: 0 | Status: SHIPPED | OUTPATIENT
Start: 2019-05-16 | End: 2021-12-20

## 2019-05-16 RX ADMIN — ONDANSETRON 4 MG: 2 INJECTION INTRAMUSCULAR; INTRAVENOUS at 13:59

## 2019-05-16 ASSESSMENT — ENCOUNTER SYMPTOMS
MYALGIAS: 0
CARDIOVASCULAR NEGATIVE: 1
DIZZINESS: 0
DIARRHEA: 1
HEADACHES: 0
NUMBER OF EPISODES OF EMESIS TODAY: 1
CHILLS: 0
NEUROLOGICAL NEGATIVE: 1
CONSTIPATION: 0
SHORTNESS OF BREATH: 0
WHEEZING: 0
VOMITING: 1
ABDOMINAL PAIN: 1
SORE THROAT: 0
FLANK PAIN: 0
EYES NEGATIVE: 1
FEVER: 0
RESPIRATORY NEGATIVE: 1
NAUSEA: 0
BACK PAIN: 0
BLOOD IN STOOL: 0
DIAPHORESIS: 0
NECK PAIN: 0
COUGH: 0

## 2019-05-16 NOTE — LETTER
May 16, 2019         Patient: Earlene Aggarwal   YOB: 1990   Date of Visit: 5/16/2019           To Whom it May Concern:    Earlene Aggarwal was seen in my clinic on 5/16/2019. Please excuse her from work 5/16/2019 through 5/17/2019. She may return on 5/18/2019.    If you have any questions or concerns, please don't hesitate to call.        Sincerely,           NOEMI Lipscomb.  Electronically Signed

## 2019-05-16 NOTE — PROGRESS NOTES
"Subjective:   Earlene Aggarwal is a 29 y.o. female who presents for Emesis (nausea, diarrhea x 1 day)        Emesis   This is a new problem. The current episode started today. The problem occurs intermittently. The problem has been waxing and waning. Associated symptoms include abdominal pain and vomiting. Pertinent negatives include no chest pain, chills, congestion, coughing, diaphoresis, fever, headaches, myalgias, nausea, neck pain or sore throat. Nothing aggravates the symptoms. She has tried nothing for the symptoms. The treatment provided no relief.    Diarrhea is watery and with several episodes today. Denies recent antibiotic use or travel outside the country. Patient with hx of gallbladder removal prior. Denies urinary symptoms and with prior tubal ligation.    Review of Systems   Constitutional: Negative for chills, diaphoresis and fever.   HENT: Negative for congestion and sore throat.    Eyes: Negative.    Respiratory: Negative.  Negative for cough, shortness of breath and wheezing.    Cardiovascular: Negative.  Negative for chest pain.   Gastrointestinal: Positive for abdominal pain, diarrhea and vomiting. Negative for blood in stool, constipation, melena and nausea.   Genitourinary: Negative for dysuria, flank pain, frequency, hematuria and urgency.   Musculoskeletal: Negative for back pain, myalgias and neck pain.   Skin: Negative.    Neurological: Negative.  Negative for dizziness and headaches.     Allergies   Allergen Reactions   • Nkda [No Known Drug Allergy]       Objective:   /84   Pulse 87   Temp 37 °C (98.6 °F)   Resp 20   Ht 1.6 m (5' 3\")   Wt 120.2 kg (265 lb)   SpO2 98%   BMI 46.94 kg/m²   Physical Exam   Constitutional: She is oriented to person, place, and time. She appears well-developed and well-nourished. No distress.   HENT:   Right Ear: Hearing normal.   Left Ear: Hearing normal.   Mouth/Throat: Oropharynx is clear and moist and mucous membranes are normal.   Eyes: " Pupils are equal, round, and reactive to light. Conjunctivae are normal.   Cardiovascular: Normal rate, regular rhythm and normal heart sounds.    No murmur heard.  Pulmonary/Chest: Effort normal and breath sounds normal. No respiratory distress.   Abdominal: Soft. Normal appearance and bowel sounds are normal. She exhibits no distension. There is no hepatosplenomegaly. There is generalized tenderness. There is no rigidity, no rebound, no guarding and no CVA tenderness.   Mild generalized abdominal tenderness.   Neurological: She is alert and oriented to person, place, and time.   Skin: Skin is warm and dry. Capillary refill takes less than 2 seconds. She is not diaphoretic.   Psychiatric: She has a normal mood and affect. Her behavior is normal. Judgment and thought content normal.   Vitals reviewed.        Assessment/Plan:   Assessment    1. Gastroenteritis  - ondansetron (ZOFRAN) 4 MG Tab tablet; Take 1 Tab by mouth every 6 hours as needed for Nausea/Vomiting.  Dispense: 30 Tab; Refill: 0  - ondansetron (ZOFRAN) syringe/vial injection 4 mg; 2 mL by Intramuscular route Once.    Zofran given in office with improvement to symptoms. Discussed that most likely viral, self-limited illness and should resolve in next 2 days. If symptoms do not improve or worsen, I would like her to go to ER for further evaluation. Discussed softer, bland diet and OTC probiotic daily.    Differential diagnosis, natural history, supportive care, and indications for immediate follow-up discussed.

## 2020-07-24 ENCOUNTER — OFFICE VISIT (OUTPATIENT)
Dept: URGENT CARE | Facility: PHYSICIAN GROUP | Age: 30
End: 2020-07-24

## 2020-07-24 VITALS
BODY MASS INDEX: 40.57 KG/M2 | RESPIRATION RATE: 16 BRPM | TEMPERATURE: 96.8 F | HEIGHT: 63 IN | SYSTOLIC BLOOD PRESSURE: 120 MMHG | OXYGEN SATURATION: 99 % | WEIGHT: 229 LBS | DIASTOLIC BLOOD PRESSURE: 66 MMHG | HEART RATE: 64 BPM

## 2020-07-24 DIAGNOSIS — L70.0 ACNE VULGARIS: ICD-10-CM

## 2020-07-24 PROCEDURE — 99214 OFFICE O/P EST MOD 30 MIN: CPT | Performed by: PHYSICIAN ASSISTANT

## 2020-07-24 RX ORDER — MOMETASONE FUROATE 1 MG/G
OINTMENT TOPICAL DAILY
COMMUNITY
End: 2023-06-16

## 2020-07-24 RX ORDER — MINOCYCLINE HYDROCHLORIDE 50 MG/1
50 CAPSULE ORAL 2 TIMES DAILY
Qty: 60 CAP | Refills: 3 | Status: SHIPPED | OUTPATIENT
Start: 2020-07-24 | End: 2023-06-16

## 2020-07-24 RX ORDER — DOXYCYCLINE HYCLATE 100 MG/1
CAPSULE ORAL
COMMUNITY
Start: 2020-06-16 | End: 2020-07-24

## 2020-07-24 SDOH — HEALTH STABILITY: MENTAL HEALTH: HOW OFTEN DO YOU HAVE A DRINK CONTAINING ALCOHOL?: MONTHLY OR LESS

## 2020-07-24 ASSESSMENT — ENCOUNTER SYMPTOMS
ABDOMINAL PAIN: 0
MYALGIAS: 0
SORE THROAT: 0
VOMITING: 0
HEADACHES: 0
SHORTNESS OF BREATH: 0
COUGH: 0
FEVER: 0
EYE PAIN: 0
CHILLS: 0
DIARRHEA: 0
CONSTIPATION: 0
NAUSEA: 0

## 2020-07-24 ASSESSMENT — FIBROSIS 4 INDEX: FIB4 SCORE: 0.38

## 2020-07-24 NOTE — PATIENT INSTRUCTIONS
Acne    Acne is a skin problem that causes pimples and other skin changes. The skin has many tiny openings called pores. Each pore contains an oil gland. Oil glands make an oily substance that is called sebum. Acne occurs when the pores in the skin get blocked. The pores may become infected with bacteria, or they may become red, sore, and swollen. Acne is a common skin problem, especially for teenagers. It often occurs on the face, neck, chest, upper arms, and back. Acne usually goes away over time.  What are the causes?  Acne is caused when oil glands get blocked with sebum, dead skin cells, and dirt. The bacteria that are normally found in the oil glands then multiply and cause inflammation.  Acne is commonly triggered by changes in your hormones. These hormonal changes can cause the oil glands to get bigger and to make more sebum. Factors that can make acne worse include:  · Hormone changes during:  ? Adolescence.  ? Women's menstrual cycles.  ? Pregnancy.  · Oil-based cosmetics and hair products.  · Stress.  · Hormone problems that are caused by certain diseases.  · Certain medicines.  · Pressure from headbands, backpacks, or shoulder pads.  · Exposure to certain oils and chemicals.  · Eating a diet high in carbohydrates that quickly turn to sugar. These include dairy products, desserts, and chocolates.  What increases the risk?  This condition is more likely to develop in:  · Teenagers.  · People who have a family history of acne.  What are the signs or symptoms?  Symptoms include:  · Small, red bumps (pimples or papules).  · Whiteheads.  · Blackheads.  · Small, pus-filled pimples (pustules).  · Big, red pimples or pustules that feel tender.  More severe acne can cause:  · An abscess. This is an infected area that contains a collection of pus.  · Cysts. These are hard, painful, fluid-filled sacs.  · Scars. These can happen after large pimples heal.  How is this diagnosed?  This condition is diagnosed with a  medical history and physical exam. Blood tests may also be done.  How is this treated?  Treatment for this condition can vary depending on the severity of your acne. Treatment may include:  · Creams and lotions that prevent oil glands from clogging.  · Creams and lotions that treat or prevent infections and inflammation.  · Antibiotic medicines that are applied to the skin or taken as a pill.  · Pills that decrease sebum production.  · Birth control pills.  · Light or laser treatments.  · Injections of medicine into the affected areas.  · Chemicals that cause peeling of the skin.  · Surgery.  Your health care provider will also recommend the best way to take care of your skin. Good skin care is the most important part of treatment.  Follow these instructions at home:  Skin care  Take care of your skin as told by your health care provider. You may be told to do these things:  · Wash your skin gently at least two times each day, as well as:  ? After you exercise.  ? Before you go to bed.  · Use mild soap.  · Apply a water-based skin moisturizer after you wash your skin.  · Use a sunscreen or sunblock with SPF 30 or greater. This is especially important if you are using acne medicines.  · Choose cosmetics that will not block your oil glands (are noncomedogenic).  Medicines  · Take over-the-counter and prescription medicines only as told by your health care provider.  · If you were prescribed an antibiotic medicine, apply it or take it as told by your health care provider. Do not stop using the antibiotic even if your condition improves.  General instructions  · Keep your hair clean and off your face. If you have oily hair, shampoo your hair regularly or daily.  · Avoid wearing tight headbands or hats.  · Avoid picking or squeezing your pimples. That can make your acne worse and cause scarring.  · Shave gently and only when necessary.  · Keep a food journal to figure out if any foods are linked to your acne. Avoid dairy  products, desserts, and chocolates.  · Take steps to manage and reduce stress.  · Keep all follow-up visits as told by your health care provider. This is important.  Contact a health care provider if:  · Your acne is not better after eight weeks.  · Your acne gets worse.  · You have a large area of skin that is red or tender.  · You think that you are having side effects from any acne medicine.  Summary  · Acne is a skin problem that causes pimples and other skin changes. Acne is a common skin problem, especially for teenagers. Acne usually goes away over time.  · Acne is commonly triggered by changes in your hormones. There are many other causes, such as stress, diet, and certain medicines.  · Follow your health care provider's instructions for how to take care of your skin. Good skin care is the most important part of treatment.  · Take over-the-counter and prescription medicines only as told by your health care provider.  · Contact your health care provider if you think that you are having side effects from any acne medicine.  This information is not intended to replace advice given to you by your health care provider. Make sure you discuss any questions you have with your health care provider.  Document Released: 12/15/2001 Document Revised: 04/30/2019 Document Reviewed: 04/30/2019  Elsevier Patient Education © 2020 [employer]

## 2020-07-24 NOTE — PROGRESS NOTES
Subjective:   Earlene Parks is a 30 y.o. female who presents for Rash (On face [id number] )      This is a 30-year-old female presents to urgent care complaining of a rash periorally has been ongoing and evolving for around 4 months.  She shows me pictures on her telephone of a mild rash below the nose taken in March and then several other photos that are evolving some perioral, some nasolabial with papules and pustules.  She has tried a steroid cream as well as mupirocin which seemed to make the rash much worse.  She subsequently tried doxycycline which she said made the rash improved significantly.  She is been aggressively hydrating with a moisturizing ointment as well as washing her face twice per day.  She was previously given the diagnosis of dermatitis and was trying to follow suggestions for this.  She denies constitutional symptoms.  She has a history of acne and this feels somewhat similar.      Review of Systems   Constitutional: Negative for chills and fever.   HENT: Negative for congestion, ear pain and sore throat.    Eyes: Negative for pain.   Respiratory: Negative for cough and shortness of breath.    Cardiovascular: Negative for chest pain.   Gastrointestinal: Negative for abdominal pain, constipation, diarrhea, nausea and vomiting.   Genitourinary: Negative for dysuria.   Musculoskeletal: Negative for myalgias.   Skin: Positive for rash. Negative for itching.   Neurological: Negative for headaches.       Medications:    • ibuprofen Tabs  • minocycline Caps  • mometasone Oint  • mupirocin Oint  • ondansetron Tabs  • sumatriptan    Allergies: Nkda [no known drug allergy]    Problem List: Earlene Parks has IBS (irritable bowel syndrome); Migraine; Vision changes; Morbid obesity with BMI of 40.0-44.9, adult (HCC); Acne; and Cyst of left ovary on their problem list.    Surgical History:  Past Surgical History:   Procedure Laterality Date   • HYSTEROSCOPY THERMAL ABLATION N/A 8/20/2016     "Procedure: HYSTEROSCOPY THERMAL ABLATION;  Surgeon: Bill Lane M.D.;  Location: SURGERY [facility];  Service:    • MAMMOPLASTY REDUCTION  8/29/2012    Performed by VIVIEN HERRERA at SURGERY [facility] ORS   • TUBAL COAGULATION LAPAROSCOPIC BILATERAL  12/27/2011    Performed by BILL LANE at SURGERY [facility] ORS   • CASSANDRA BY LAPAROSCOPY  9/7/2011    Performed by RHONA JOAQUIN at SURGERY [facility] ORS   • DILATION AND CURETTAGE CONE BIOPSY  2008    Dr Savage   • TONSILLECTOMY AND ADENOIDECTOMY  1999   • EAR MIDDLE EXPLORATION      ear tubes   • LEEP         Past Social Hx: Earlene Parks  reports that she quit smoking about 6 years ago. She quit after 10.00 years of use. She has quit using smokeless tobacco. She reports current alcohol use of about 1.2 oz of alcohol per week. She reports current drug use. Drugs: Marijuana and Inhaled.     Past Family Hx:  Earlene Parks family history includes Cancer in her maternal grandmother, mother, and paternal grandfather; Diabetes in her father; Hypertension in an other family member.     Problem list, medications, and allergies reviewed by myself today in Epic.     Objective:     /66 (BP Location: Left arm, Patient Position: Sitting, BP Cuff Size: Adult)   Pulse 64   Temp 36 °C (96.8 °F) (Temporal)   Resp 16   Ht 1.6 m (5' 3\")   Wt 103.9 kg (229 lb)   SpO2 99%   BMI 40.57 kg/m²     Physical Exam  Vitals signs reviewed.   Constitutional:       Appearance: Normal appearance.   HENT:      Head: Normocephalic and atraumatic.      Right Ear: External ear normal.      Left Ear: External ear normal.      Nose: Nose normal.      Mouth/Throat:      Mouth: Mucous membranes are moist.   Eyes:      Conjunctiva/sclera: Conjunctivae normal.   Cardiovascular:      Rate and Rhythm: Normal rate.   Pulmonary:      Effort: Pulmonary effort is normal.   Skin:     General: Skin is warm and dry.      Capillary Refill: Capillary refill takes less " than 2 seconds.      Comments: On the upper lip and in the nasolabial fold there is comedones, papules and pustules.  The area has some mild erythema.   Neurological:      Mental Status: She is alert and oriented to person, place, and time.         Assessment/Plan:     Diagnosis and associated orders:     1. Acne vulgaris  minocycline (MINOCIN) 50 MG Cap      Comments/MDM:     • Discussed acne skin care regimen with astringent wash once per day we will do a trial of minocycline as she is previously improved with doxycycline.  She does not have insurance or primary care provider so we are trying to find a cost effective treatment regimen for her and I think minocycline would be a reasonable option as is only about $17 at [facility].  I told her to try this regimen for a week or 2 weeks and if she is not improving to return urgent care.           Differential diagnosis, natural history, supportive care, and indications for immediate follow-up discussed.    Advised the patient to follow-up with the primary care physician for recheck, reevaluation, and consideration of further management.    Please note that this dictation was created using voice recognition software. I have made a reasonable attempt to correct obvious errors, but I expect that there are errors of grammar and possibly content that I did not discover before finalizing the note.    This note was electronically signed by Jim Mcelroy PA-C

## 2020-09-15 ENCOUNTER — NON-PROVIDER VISIT (OUTPATIENT)
Dept: URGENT CARE | Facility: PHYSICIAN GROUP | Age: 30
End: 2020-09-15
Payer: COMMERCIAL

## 2020-09-15 DIAGNOSIS — Z02.1 PRE-EMPLOYMENT DRUG SCREENING: Primary | ICD-10-CM

## 2020-09-15 LAB
AMP AMPHETAMINE: NORMAL
BAR BARBITURATES: NORMAL
BZO BENZODIAZEPINES: NORMAL
COC COCAINE: NORMAL
INT CON NEG: NORMAL
INT CON POS: NORMAL
MDMA ECSTASY: NORMAL
MET METHAMPHETAMINES: NORMAL
MTD METHADONE: NORMAL
OPI OPIATES: NORMAL
OXY OXYCODONE: NORMAL
PCP PHENCYCLIDINE: NORMAL
POC URINE DRUG SCREEN OCDRS: NEGATIVE
THC: NORMAL

## 2020-09-15 PROCEDURE — 80305 DRUG TEST PRSMV DIR OPT OBS: CPT | Performed by: PHYSICIAN ASSISTANT

## 2021-07-25 ENCOUNTER — HOSPITAL ENCOUNTER (OUTPATIENT)
Dept: RADIOLOGY | Facility: MEDICAL CENTER | Age: 31
End: 2021-07-25
Attending: PHYSICIAN ASSISTANT
Payer: COMMERCIAL

## 2021-07-25 DIAGNOSIS — M25.561 RIGHT KNEE PAIN, UNSPECIFIED CHRONICITY: ICD-10-CM

## 2021-07-25 PROCEDURE — 73721 MRI JNT OF LWR EXTRE W/O DYE: CPT | Mod: RT

## 2021-07-26 ENCOUNTER — APPOINTMENT (OUTPATIENT)
Dept: RADIOLOGY | Facility: MEDICAL CENTER | Age: 31
End: 2021-07-26
Attending: EMERGENCY MEDICINE
Payer: COMMERCIAL

## 2021-07-26 ENCOUNTER — HOSPITAL ENCOUNTER (EMERGENCY)
Facility: MEDICAL CENTER | Age: 31
End: 2021-07-26
Attending: EMERGENCY MEDICINE
Payer: COMMERCIAL

## 2021-07-26 VITALS
RESPIRATION RATE: 16 BRPM | OXYGEN SATURATION: 97 % | HEIGHT: 63 IN | TEMPERATURE: 98.6 F | HEART RATE: 64 BPM | DIASTOLIC BLOOD PRESSURE: 61 MMHG | WEIGHT: 205.69 LBS | SYSTOLIC BLOOD PRESSURE: 97 MMHG | BODY MASS INDEX: 36.45 KG/M2

## 2021-07-26 DIAGNOSIS — R10.13 ABDOMINAL PAIN, EPIGASTRIC: ICD-10-CM

## 2021-07-26 LAB
ALBUMIN SERPL BCP-MCNC: 4.6 G/DL (ref 3.2–4.9)
ALBUMIN/GLOB SERPL: 1.8 G/DL
ALP SERPL-CCNC: 50 U/L (ref 30–99)
ALT SERPL-CCNC: 31 U/L (ref 2–50)
ANION GAP SERPL CALC-SCNC: 13 MMOL/L (ref 7–16)
AST SERPL-CCNC: 41 U/L (ref 12–45)
BASOPHILS # BLD AUTO: 0.5 % (ref 0–1.8)
BASOPHILS # BLD: 0.06 K/UL (ref 0–0.12)
BILIRUB SERPL-MCNC: 0.3 MG/DL (ref 0.1–1.5)
BUN SERPL-MCNC: 11 MG/DL (ref 8–22)
CALCIUM SERPL-MCNC: 9.7 MG/DL (ref 8.4–10.2)
CHLORIDE SERPL-SCNC: 106 MMOL/L (ref 96–112)
CO2 SERPL-SCNC: 22 MMOL/L (ref 20–33)
CREAT SERPL-MCNC: 0.66 MG/DL (ref 0.5–1.4)
EKG IMPRESSION: NORMAL
EOSINOPHIL # BLD AUTO: 0.07 K/UL (ref 0–0.51)
EOSINOPHIL NFR BLD: 0.6 % (ref 0–6.9)
ERYTHROCYTE [DISTWIDTH] IN BLOOD BY AUTOMATED COUNT: 45.2 FL (ref 35.9–50)
GLOBULIN SER CALC-MCNC: 2.5 G/DL (ref 1.9–3.5)
GLUCOSE SERPL-MCNC: 91 MG/DL (ref 65–99)
HCG SERPL QL: NEGATIVE
HCT VFR BLD AUTO: 42.5 % (ref 37–47)
HGB BLD-MCNC: 14.1 G/DL (ref 12–16)
IMM GRANULOCYTES # BLD AUTO: 0.05 K/UL (ref 0–0.11)
IMM GRANULOCYTES NFR BLD AUTO: 0.4 % (ref 0–0.9)
LIPASE SERPL-CCNC: 45 U/L (ref 7–58)
LYMPHOCYTES # BLD AUTO: 2.51 K/UL (ref 1–4.8)
LYMPHOCYTES NFR BLD: 20.3 % (ref 22–41)
MCH RBC QN AUTO: 31.3 PG (ref 27–33)
MCHC RBC AUTO-ENTMCNC: 33.2 G/DL (ref 33.6–35)
MCV RBC AUTO: 94.2 FL (ref 81.4–97.8)
MONOCYTES # BLD AUTO: 0.53 K/UL (ref 0–0.85)
MONOCYTES NFR BLD AUTO: 4.3 % (ref 0–13.4)
NEUTROPHILS # BLD AUTO: 9.13 K/UL (ref 2–7.15)
NEUTROPHILS NFR BLD: 73.9 % (ref 44–72)
NRBC # BLD AUTO: 0 K/UL
NRBC BLD-RTO: 0 /100 WBC
PLATELET # BLD AUTO: 236 K/UL (ref 164–446)
PMV BLD AUTO: 9.3 FL (ref 9–12.9)
POTASSIUM SERPL-SCNC: 3.9 MMOL/L (ref 3.6–5.5)
PROT SERPL-MCNC: 7.1 G/DL (ref 6–8.2)
RBC # BLD AUTO: 4.51 M/UL (ref 4.2–5.4)
SODIUM SERPL-SCNC: 141 MMOL/L (ref 135–145)
TROPONIN T SERPL-MCNC: <6 NG/L (ref 6–19)
WBC # BLD AUTO: 12.4 K/UL (ref 4.8–10.8)

## 2021-07-26 PROCEDURE — 700111 HCHG RX REV CODE 636 W/ 250 OVERRIDE (IP): Performed by: EMERGENCY MEDICINE

## 2021-07-26 PROCEDURE — 93005 ELECTROCARDIOGRAM TRACING: CPT | Performed by: EMERGENCY MEDICINE

## 2021-07-26 PROCEDURE — 36415 COLL VENOUS BLD VENIPUNCTURE: CPT

## 2021-07-26 PROCEDURE — 80053 COMPREHEN METABOLIC PANEL: CPT

## 2021-07-26 PROCEDURE — 84484 ASSAY OF TROPONIN QUANT: CPT

## 2021-07-26 PROCEDURE — 700117 HCHG RX CONTRAST REV CODE 255: Performed by: EMERGENCY MEDICINE

## 2021-07-26 PROCEDURE — 85025 COMPLETE CBC W/AUTO DIFF WBC: CPT

## 2021-07-26 PROCEDURE — 700102 HCHG RX REV CODE 250 W/ 637 OVERRIDE(OP): Performed by: EMERGENCY MEDICINE

## 2021-07-26 PROCEDURE — 84703 CHORIONIC GONADOTROPIN ASSAY: CPT

## 2021-07-26 PROCEDURE — 71275 CT ANGIOGRAPHY CHEST: CPT

## 2021-07-26 PROCEDURE — 99285 EMERGENCY DEPT VISIT HI MDM: CPT

## 2021-07-26 PROCEDURE — A9270 NON-COVERED ITEM OR SERVICE: HCPCS | Performed by: EMERGENCY MEDICINE

## 2021-07-26 PROCEDURE — 83690 ASSAY OF LIPASE: CPT

## 2021-07-26 PROCEDURE — 96374 THER/PROPH/DIAG INJ IV PUSH: CPT

## 2021-07-26 RX ORDER — PANTOPRAZOLE SODIUM 40 MG/1
40 TABLET, DELAYED RELEASE ORAL DAILY
Qty: 30 TABLET | Refills: 0 | Status: SHIPPED | OUTPATIENT
Start: 2021-07-26 | End: 2021-12-20

## 2021-07-26 RX ORDER — ONDANSETRON 2 MG/ML
4 INJECTION INTRAMUSCULAR; INTRAVENOUS ONCE
Status: COMPLETED | OUTPATIENT
Start: 2021-07-26 | End: 2021-07-26

## 2021-07-26 RX ADMIN — LIDOCAINE HYDROCHLORIDE 30 ML: 20 SOLUTION OROPHARYNGEAL at 20:48

## 2021-07-26 RX ADMIN — IOHEXOL 60 ML: 350 INJECTION, SOLUTION INTRAVENOUS at 21:50

## 2021-07-26 RX ADMIN — ONDANSETRON 4 MG: 2 INJECTION INTRAMUSCULAR; INTRAVENOUS at 20:48

## 2021-07-27 NOTE — ED TRIAGE NOTES
" for above complaints.     Chief Complaint   Patient presents with   • Chest Pain     was out at dinner and had sudden onset chest pain, sweating, flushed, shortness of breath, got up to the bathroom and felt like she was going to pass out, never passed out or fell.      /75   Pulse 71   Temp 36.9 °C (98.5 °F) (Temporal)   Resp 19   Ht 1.6 m (5' 3\")   Wt 93.3 kg (205 lb 11 oz)   SpO2 99%   Breastfeeding No   BMI 36.44 kg/m²     "

## 2021-07-27 NOTE — ED NOTES
D/C instn reviewed Fill and start protonix in am Avoid cafiene, alcohol, spicy and acidic foods Return for fever or new and concerning symptoms Obtain and F/U PMD D/C ambul Stable improved condn

## 2021-07-27 NOTE — DISCHARGE INSTRUCTIONS
I can find no specific cause for this pain.  One thing that can be seen on a can see in his stomach and our concerns of gastritis, or acid in the esophagus.    Some your symptoms are consistent with reflux so you be placed on proton pump inhibitor like Protonix to decrease his acid production.    Use Tylenol as needed in the meantime until this resolves

## 2021-07-27 NOTE — ED PROVIDER NOTES
ED Provider Note    ED Provider    Means of arrival: Ambulance  History obtained from: Patient  History limited by: None    CHIEF COMPLAINT  Chief Complaint   Patient presents with   • Chest Pain     was out at dinner and had sudden onset chest pain, sweating, flushed, shortness of breath, got up to the bathroom and felt like she was going to pass out, never passed out or fell.        HPI  Earlene Parks is a 31 y.o. female who presents with complaints of midepigastric pain, nausea, sweats, and lightheadedness.  This occurred right after eating dinner at a restaurant.  After she stood up she felt the symptoms.  She still has mild tenderness chest pain is in the epigastric area, lower sternum area.  The pain radiates to the back.  She has a history of her gallbladder being removed.  History of IBS.  No history of reflux or ulcers.    She has no diabetes high blood pressure high cholesterol.  No history of asthma or other lung disease.    She had an episode yesterday of after eating dinner she vomited, she had no abdominal pain or sweating or near syncopal episode    REVIEW OF SYSTEMS  See HPI for further details. All other systems are negative.     PAST MEDICAL HISTORY   has a past medical history of IBS (irritable bowel syndrome), Migraine, and Severe cervical dysplasia, histologically confirmed.    SOCIAL HISTORY  Social History     Tobacco Use   • Smoking status: Former Smoker     Years: 10.00     Quit date:      Years since quittin.5   • Smokeless tobacco: Former User   • Tobacco comment: on and off for ten years    Vaping Use   • Vaping Use: Never used   Substance and Sexual Activity   • Alcohol use: Yes     Alcohol/week: 1.2 oz     Types: 2 Standard drinks or equivalent per week   • Drug use: Yes     Types: Marijuana, Inhaled     Comment: marijuana   • Sexual activity: Yes     Partners: Male     Birth control/protection: Condom, Surgical       SURGICAL HISTORY   has a past surgical history that  "includes tonsillectomy and adenoidectomy (1999); ear middle exploration; dilation and curettage cone biopsy (2008); joshua by laparoscopy (9/7/2011); tubal coagulation laparoscopic bilateral (12/27/2011); mammoplasty reduction (8/29/2012); hysteroscopy thermal ablation (N/A, 8/20/2016); and leep.    CURRENT MEDICATIONS  Home Medications    **Home medications have not yet been reviewed for this encounter**         ALLERGIES  Allergies   Allergen Reactions   • Nkda [No Known Drug Allergy]        PHYSICAL EXAM  VITAL SIGNS: /68   Pulse 70   Temp 36.9 °C (98.5 °F) (Temporal)   Resp 19   Ht 1.6 m (5' 3\")   Wt 93.3 kg (205 lb 11 oz)   SpO2 98%   Breastfeeding No   BMI 36.44 kg/m²   Constitutional: Alert in no apparent distress.  HENT: No signs of trauma, Mucous membranes are moist   Eyes:  Conjunctiva normal, Non-icteric.   Neck: Normal range of motion, No tenderness, Supple,  Lymphatic: No lymphadenopathy noted.   Cardiovascular: Regular rate and rhythm, no murmurs.   Thorax & Lungs: Normal breath sounds, No respiratory distress, No wheezing, No chest tenderness.   Abdomen: Bowel sounds normal, Soft, mild midepigastric tenderness, no guarding rigidity or rebound, No masses, No pulsatile masses. No peritoneal signs.  Skin: Warm, Dry,Normal color  Back: No bony tenderness, No CVA tenderness.   Extremities:No edema, No tenderness, No cyanosis,    Musculoskeletal: Good range of motion in all major joints. No tenderness to palpation or major deformities noted.   Neurologic: Alert ,Oriented x4, Normal motor function, Normal sensory function, No focal deficits noted.   Psychiatric: Affect normal, Judgment normal, Mood normal.       MEDICAL DECISION MAKING  This is a 31 y.o. female who presents with symptoms as described above.  The pain is in the midepigastric area/lower chest rating to the back.  There is concerns for GERD reflux, pancreatitis, and aortic aneurysm/dissection.  She has no cardiac risk factors but " cardiac evaluation be done also due to the concern of chest pain.    DIAGNOSTIC STUDIES / PROCEDURES    EKG      LABS  Results for orders placed or performed during the hospital encounter of 07/26/21   CBC w/ Differential   Result Value Ref Range    WBC 12.4 (H) 4.8 - 10.8 K/uL    RBC 4.51 4.20 - 5.40 M/uL    Hemoglobin 14.1 12.0 - 16.0 g/dL    Hematocrit 42.5 37.0 - 47.0 %    MCV 94.2 81.4 - 97.8 fL    MCH 31.3 27.0 - 33.0 pg    MCHC 33.2 (L) 33.6 - 35.0 g/dL    RDW 45.2 35.9 - 50.0 fL    Platelet Count 236 164 - 446 K/uL    MPV 9.3 9.0 - 12.9 fL    Neutrophils-Polys 73.90 (H) 44.00 - 72.00 %    Lymphocytes 20.30 (L) 22.00 - 41.00 %    Monocytes 4.30 0.00 - 13.40 %    Eosinophils 0.60 0.00 - 6.90 %    Basophils 0.50 0.00 - 1.80 %    Immature Granulocytes 0.40 0.00 - 0.90 %    Nucleated RBC 0.00 /100 WBC    Neutrophils (Absolute) 9.13 (H) 2.00 - 7.15 K/uL    Lymphs (Absolute) 2.51 1.00 - 4.80 K/uL    Monos (Absolute) 0.53 0.00 - 0.85 K/uL    Eos (Absolute) 0.07 0.00 - 0.51 K/uL    Baso (Absolute) 0.06 0.00 - 0.12 K/uL    Immature Granulocytes (abs) 0.05 0.00 - 0.11 K/uL    NRBC (Absolute) 0.00 K/uL   Complete Metabolic Panel (CMP)   Result Value Ref Range    Sodium 141 135 - 145 mmol/L    Potassium 3.9 3.6 - 5.5 mmol/L    Chloride 106 96 - 112 mmol/L    Co2 22 20 - 33 mmol/L    Anion Gap 13.0 7.0 - 16.0    Glucose 91 65 - 99 mg/dL    Bun 11 8 - 22 mg/dL    Creatinine 0.66 0.50 - 1.40 mg/dL    Calcium 9.7 8.4 - 10.2 mg/dL    AST(SGOT) 41 12 - 45 U/L    ALT(SGPT) 31 2 - 50 U/L    Alkaline Phosphatase 50 30 - 99 U/L    Total Bilirubin 0.3 0.1 - 1.5 mg/dL    Albumin 4.6 3.2 - 4.9 g/dL    Total Protein 7.1 6.0 - 8.2 g/dL    Globulin 2.5 1.9 - 3.5 g/dL    A-G Ratio 1.8 g/dL   Troponin STAT   Result Value Ref Range    Troponin T <6 6 - 19 ng/L   Lipase   Result Value Ref Range    Lipase 45 7 - 58 U/L   HCG QUAL SERUM   Result Value Ref Range    Beta-Hcg Qualitative Serum Negative Negative   ESTIMATED GFR   Result Value Ref  Range    GFR If African American >60 >60 mL/min/1.73 m 2    GFR If Non African American >60 >60 mL/min/1.73 m 2   EKG   Result Value Ref Range    Report       [facility] Emergency Dept.    Test Date:  2021  Pt Name:    SHIRLENE HI                Department: [facility]  MRN:        [id number]                      Room:  Gender:     Female                       Technician:   :        [id number]                   Requested By:MYKEL POLANCO  Order #:    [id number]                    Reading MD: SHAYAN AYALA D.O.    Measurements  Intervals                                Axis  Rate:       59                           P:          41  NE:         162                          QRS:        25  QRSD:       101                          T:          37  QT:         416  QTc:        413    Interpretive Statements  Sinus bradycardia  Compared to ECG 2018 18:19:37  Sinus arrhythmia no longer present  Right ventricular hypertrophy no longer present  Electronically Signed On 2021 22:05:10 PDT by SHAYAN AYALA D.O.           RADIOLOGY  CT-CTA CHEST PULMONARY ARTERY W/ RECONS   Final Result      No pulmonary embolus. No acute abnormality in the chest.             COURSE  Pertinent Labs & Imaging studies reviewed. (See chart for details)    8:26 PM - Patient seen and examined at bedside. Discussed plan of care,    Patient presents with symptoms as described above.  She is given a GI cocktail which did improve her pain.  Her white blood cell count is mildly elevated.  No signs of other infection.  CTA was done it shows no cardiopulmonary disease.  No signs of biliary disease.    Her cardiac evaluation is negative.  Her heart score is 0.  With this she is stable for discharge home.  There may be gastritis or reflux causing her discomfort this cannot always be seen on CT.  Should be placed on a proton pump inhibitor and given Tylenol for pain in the meantime.    Discharged home in stable  condition    FINAL IMPRESSION  1. Abdominal pain, epigastric        The note accurately reflects work and decisions made by me.  Sumeet Gill D.O.  7/26/2021  10:08 PM

## 2021-07-27 NOTE — ED NOTES
2040 Assessment done NSL initiated Blood to lab  2056 Medicated for pain and N/V  POC reviewed Awaiting results and CT

## 2021-11-09 ENCOUNTER — TELEPHONE (OUTPATIENT)
Dept: SCHEDULING | Facility: IMAGING CENTER | Age: 31
End: 2021-11-09

## 2021-12-20 ENCOUNTER — OFFICE VISIT (OUTPATIENT)
Dept: MEDICAL GROUP | Facility: PHYSICIAN GROUP | Age: 31
End: 2021-12-20
Payer: COMMERCIAL

## 2021-12-20 VITALS
HEART RATE: 74 BPM | SYSTOLIC BLOOD PRESSURE: 120 MMHG | HEIGHT: 63 IN | TEMPERATURE: 98.7 F | DIASTOLIC BLOOD PRESSURE: 84 MMHG | BODY MASS INDEX: 38.98 KG/M2 | WEIGHT: 220 LBS | OXYGEN SATURATION: 97 %

## 2021-12-20 DIAGNOSIS — Z76.89 ENCOUNTER TO ESTABLISH CARE: ICD-10-CM

## 2021-12-20 DIAGNOSIS — E66.9 OBESITY (BMI 30-39.9): ICD-10-CM

## 2021-12-20 DIAGNOSIS — G43.101 MIGRAINE WITH AURA AND WITH STATUS MIGRAINOSUS, NOT INTRACTABLE: ICD-10-CM

## 2021-12-20 DIAGNOSIS — L71.0 PERIORAL DERMATITIS: ICD-10-CM

## 2021-12-20 DIAGNOSIS — F32.1 CURRENT MODERATE EPISODE OF MAJOR DEPRESSIVE DISORDER WITHOUT PRIOR EPISODE (HCC): ICD-10-CM

## 2021-12-20 DIAGNOSIS — F41.1 GAD (GENERALIZED ANXIETY DISORDER): ICD-10-CM

## 2021-12-20 DIAGNOSIS — Z86.59 HISTORY OF ATTENTION DEFICIT HYPERACTIVITY DISORDER (ADHD): ICD-10-CM

## 2021-12-20 PROCEDURE — 99214 OFFICE O/P EST MOD 30 MIN: CPT | Performed by: NURSE PRACTITIONER

## 2021-12-20 ASSESSMENT — ANXIETY QUESTIONNAIRES
5. BEING SO RESTLESS THAT IT IS HARD TO SIT STILL: NEARLY EVERY DAY
GAD7 TOTAL SCORE: 9
6. BECOMING EASILY ANNOYED OR IRRITABLE: NOT AT ALL
2. NOT BEING ABLE TO STOP OR CONTROL WORRYING: SEVERAL DAYS
1. FEELING NERVOUS, ANXIOUS, OR ON EDGE: SEVERAL DAYS
4. TROUBLE RELAXING: MORE THAN HALF THE DAYS
7. FEELING AFRAID AS IF SOMETHING AWFUL MIGHT HAPPEN: NOT AT ALL
3. WORRYING TOO MUCH ABOUT DIFFERENT THINGS: MORE THAN HALF THE DAYS

## 2021-12-20 ASSESSMENT — FIBROSIS 4 INDEX: FIB4 SCORE: 1.03

## 2021-12-20 ASSESSMENT — PATIENT HEALTH QUESTIONNAIRE - PHQ9
CLINICAL INTERPRETATION OF PHQ2 SCORE: 2
SUM OF ALL RESPONSES TO PHQ QUESTIONS 1-9: 11
5. POOR APPETITE OR OVEREATING: 2 - MORE THAN HALF THE DAYS

## 2021-12-20 NOTE — PROGRESS NOTES
Subjective:     CC:  Diagnoses of History of attention deficit hyperactivity disorder (ADHD), Perioral dermatitis, RADHA (generalized anxiety disorder), Current moderate episode of major depressive disorder without prior episode (HCC), Migraine with aura and with status migrainosus, not intractable, and Obesity (BMI 30-39.9) were pertinent to this visit.    HISTORY OF THE PRESENT ILLNESS: Patient is a 31 y.o. female. This pleasant patient is here today to establish care and discuss the following. She has not had PCP, states that she has been using online MD care.    History of attention deficit hyperactivity disorder (ADHD)  She reports she has been followed by her psychologist Dr. Akhil Mercado since August 2021. She has previously taking Adderall and Ritalin during childhood. She stopped taking the medication at age 18 due to pregnancy. She reports she was diagnosed in the 3rd grade. She would like a referral to a psychiatrist.     Perioral dermatitis  She is using mometasone ointment. She is washing her face frequently throughout the day. She has changed her diet, cut out dairy and started keto diet. No acute complaints today.    RADHA (generalized anxiety disorder)  Her radha score today is 9.  She is currently followed by her psychologist.  She is interested in a referral to psychiatry.  She denies any self-harm or SI today.    Current moderate episode of major depressive disorder without prior episode (HCC)  She has been on SSRI medication in the past.  She reports that the medication worsened her migraines and she stopped the medication. Her PHQ score today is 11.  She denies any self-harm or SI today.  She is interested in a referral to psychiatry.    Migraine  She is taking ibuprofen as needed for her migraines. No acute complaints today.     Obesity (BMI 30-39.9)  Her BMI today is 38.97 kg/m².  She has been working on weight loss and following a keto diet.  She reports over 20 pound weight  loss.      Allergies: Nkda [no known drug allergy]    Current Outpatient Medications Ordered in Epic   Medication Sig Dispense Refill   • minocycline (MINOCIN) 50 MG Cap Take 1 Cap by mouth 2 times a day. 60 Cap 3   • ibuprofen (MOTRIN) 200 MG Tab Take 200 mg by mouth every 6 hours as needed. Indications: pt states that she was taking about 6 at a time     • mupirocin (BACTROBAN) 2 % Ointment Apply 1 Application to affected area(s) 2 times a day.     • mometasone (ELOCON) 0.1 % Ointment Apply  to affected area(s) every day.       No current [facility]-ordered facility-administered medications on file.       Past Medical History:   Diagnosis Date   • IBS (irritable bowel syndrome)    • Migraine    • Severe cervical dysplasia, histologically confirmed     hpv       Past Surgical History:   Procedure Laterality Date   • HYSTEROSCOPY THERMAL ABLATION N/A 2016    Procedure: HYSTEROSCOPY THERMAL ABLATION;  Surgeon: Bill Lane M.D.;  Location: [facility];  Service:    • MAMMOPLASTY REDUCTION  2012    Performed by VIVIEN HERRERA at [facility] ORS   • TUBAL COAGULATION LAPAROSCOPIC BILATERAL  2011    Performed by BILL LANE at [facility] ORS   • CASSANDRA BY LAPAROSCOPY  2011    Performed by RHONA JOAQUIN at SURGERY [facility]   • DILATION AND CURETTAGE CONE BIOPSY      Dr Savage   • TONSILLECTOMY AND ADENOIDECTOMY     • EAR MIDDLE EXPLORATION      ear tubes   • LEEP         Social History     Tobacco Use   • Smoking status: Former Smoker     Years: 10.00     Quit date:      Years since quittin.9   • Smokeless tobacco: Former User   • Tobacco comment: on and off for ten years    Vaping Use   • Vaping Use: Never used   Substance Use Topics   • Alcohol use: Yes     Alcohol/week: 1.2 oz     Types: 2 Standard drinks or equivalent per week   • Drug use: Yes     Frequency: 7.0 times per week     Types: Marijuana, Inhaled     Comment: marijuana  "      Social History     Social History Narrative     for [employer]             Family History   Problem Relation Age of Onset   • Cancer Mother         cervical   • ADD / ADHD Mother    • Depression Mother    • Anxiety disorder Mother    • Diabetes Father    • Cancer Maternal Grandmother         lung   • Cancer Paternal Grandfather         melanoma   • Other Sister         PCOS   • Thyroid Sister    • ADD / ADHD Brother    • Other Sister         PCOS   • Anxiety disorder Son    • Other Son    • No Known Problems Brother        Health Maintenance: COVID booster and pap smear        Objective:     Vital signs reviewed  Exam: /84 (BP Location: Right arm, Patient Position: Sitting, BP Cuff Size: Adult)   Pulse 74   Temp 37.1 °C (98.7 °F) (Temporal)   Ht 1.6 m (5' 3\")   Wt 99.8 kg (220 lb)   SpO2 97%  Body mass index is 38.97 kg/m².    Gen: Alert and oriented, No apparent distress.  Neck: Neck is supple, full ROM.  Lungs: Normal effort, no audible wheezes  CV: Skin pink, warm and dry.  Ext: No clubbing, cyanosis, edema.      Assessment & Plan:   31 y.o. female with the following -    1. Encounter to establish care  Acute uncomplicated problem.  Care established today.    2. History of attention deficit hyperactivity disorder (ADHD)  Chronic exacerbated problem.  She is interested in restarting medication.  She is interested referral to psychiatry, referral placed.  We will request records from her psychologist.  Discussed with patient that I would like to review her records from her recent psychologist prior to any medications.  She verbalized understanding.  - Referral to Psychiatry    3. Current moderate episode of major depressive disorder without prior episode (HCC)  Chronic exacerbated problem.  She has not been on any medication.  She is interested in a referral to psychiatry, referral placed.  Discussed and reviewed her PHQ score today.  She denies any self-harm or SI today.  We will " hold off on any medications at this time.  - Referral to Psychiatry    4. RADHA (generalized anxiety disorder)  Acute uncomplicated problem.  Discussed and reviewed her radha score today.  She would like to establish with psychiatry, referral placed today.  She denies any self-harm or SI today.  - Referral to Psychiatry    5. Perioral dermatitis  Chronic stable problem.  She will continue with her mometasone ointment.  She will continue with daily skin hygiene.  No acute complaints today.    6. Migraine with aura and with status migrainosus, not intractable  Chronic stable problem.  She will continue with ibuprofen as needed.  No acute complaints today.    7. Obesity (BMI 30-39.9)  Chronic stable problem.  She will continue with diet and lifestyle modifications.  - Patient identified as having weight management issue.  Appropriate orders and counseling given.        Return in about 4 weeks (around 1/17/2022) for annual with pap.    Please note that this dictation was created using voice recognition software. I have made every reasonable attempt to correct obvious errors, but I expect that there are errors of grammar and possibly content that I did not discover before finalizing the note.

## 2021-12-20 NOTE — ASSESSMENT & PLAN NOTE
Her angi score today is 9.  She is currently followed by her psychologist.  She is interested in a referral to psychiatry.  She denies any self-harm or SI today.

## 2021-12-20 NOTE — ASSESSMENT & PLAN NOTE
She is using mometasone ointment. She is washing her face frequently throughout the day. She has changed her diet, cut out dairy and started keto diet. No acute complaints today.

## 2021-12-20 NOTE — ASSESSMENT & PLAN NOTE
She reports she has been followed by her psychologist Dr. Akhil Mercado since August 2021. She has previously taking Adderall and Ritalin during childhood. She stopped taking the medication at age 18 due to pregnancy. She reports she was diagnosed in the 3rd grade. She would like a referral to a psychiatrist.

## 2021-12-20 NOTE — ASSESSMENT & PLAN NOTE
She has been on SSRI medication in the past.  She reports that the medication worsened her migraines and she stopped the medication. Her PHQ score today is 11.  She denies any self-harm or SI today.  She is interested in a referral to psychiatry.

## 2021-12-20 NOTE — ASSESSMENT & PLAN NOTE
Her BMI today is 38.97 kg/m².  She has been working on weight loss and following a keto diet.  She reports over 20 pound weight loss.

## 2021-12-20 NOTE — LETTER
[facility]  CALLY Chavarria  [address] [place] [address]  Fax: [phone]   Authorization for Release/Disclosure of   Protected Health Information   Name: EARLENE HI : 1990 SSN: [id number]   Address:  [place] [address] Phone:    [phone] (home)    I authorize the entity listed below to release/disclose the PHI below to:   [facility]/CALLY Chavarria and CALLY Chavarria   Provider or Entity Name:  Dr. Akhil Mercado    Address   City, [facility], [facility]   Phone:      Fax:     Reason for request: continuity of care   Information to be released:    [  ] LAST COLONOSCOPY,  including any PATH REPORT and follow-up  [  ] LAST FIT/COLOGUARD RESULT [  ] LAST DEXA  [  ] LAST MAMMOGRAM  [  ] LAST PAP  [  ] LAST LABS [  ] RETINA EXAM REPORT  [  ] IMMUNIZATION RECORDS  [  ] Release all info      [  ] Check here and initial the line next to each item to release ALL health information INCLUDING  _____ Care and treatment for drug and / or alcohol abuse  _____ HIV testing, infection status, or AIDS  _____ Genetic Testing    DATES OF SERVICE OR TIME PERIOD TO BE DISCLOSED: _____________  I understand and acknowledge that:  * This Authorization may be revoked at any time by you in writing, except if your health information has already been used or disclosed.  * Your health information that will be used or disclosed as a result of you signing this authorization could be re-disclosed by the recipient. If this occurs, your re-disclosed health information may no longer be protected by State or Federal laws.  * You may refuse to sign this Authorization. Your refusal will not affect your ability to obtain treatment.  * This Authorization becomes effective upon signing and will  on (date) __________.      If no date is indicated, this Authorization will  one (1) year from the signature date.    Name: Earlene Hi    Signature:   Date:      12/20/2021       PLEASE FAX REQUESTED RECORDS BACK TO: [phone]

## 2023-06-15 SDOH — ECONOMIC STABILITY: FOOD INSECURITY: WITHIN THE PAST 12 MONTHS, THE FOOD YOU BOUGHT JUST DIDN'T LAST AND YOU DIDN'T HAVE MONEY TO GET MORE.: NEVER TRUE

## 2023-06-15 SDOH — HEALTH STABILITY: PHYSICAL HEALTH: ON AVERAGE, HOW MANY DAYS PER WEEK DO YOU ENGAGE IN MODERATE TO STRENUOUS EXERCISE (LIKE A BRISK WALK)?: 3 DAYS

## 2023-06-15 SDOH — ECONOMIC STABILITY: HOUSING INSECURITY

## 2023-06-15 SDOH — ECONOMIC STABILITY: HOUSING INSECURITY
IN THE LAST 12 MONTHS, WAS THERE A TIME WHEN YOU DID NOT HAVE A STEADY PLACE TO SLEEP OR SLEPT IN A SHELTER (INCLUDING NOW)?: NO

## 2023-06-15 SDOH — ECONOMIC STABILITY: TRANSPORTATION INSECURITY
IN THE PAST 12 MONTHS, HAS THE LACK OF TRANSPORTATION KEPT YOU FROM MEDICAL APPOINTMENTS OR FROM GETTING MEDICATIONS?: NO

## 2023-06-15 SDOH — HEALTH STABILITY: PHYSICAL HEALTH: ON AVERAGE, HOW MANY MINUTES DO YOU ENGAGE IN EXERCISE AT THIS LEVEL?: 60 MIN

## 2023-06-15 SDOH — ECONOMIC STABILITY: INCOME INSECURITY: HOW HARD IS IT FOR YOU TO PAY FOR THE VERY BASICS LIKE FOOD, HOUSING, MEDICAL CARE, AND HEATING?: NOT HARD AT ALL

## 2023-06-15 SDOH — ECONOMIC STABILITY: FOOD INSECURITY: WITHIN THE PAST 12 MONTHS, YOU WORRIED THAT YOUR FOOD WOULD RUN OUT BEFORE YOU GOT MONEY TO BUY MORE.: NEVER TRUE

## 2023-06-15 SDOH — ECONOMIC STABILITY: TRANSPORTATION INSECURITY
IN THE PAST 12 MONTHS, HAS LACK OF RELIABLE TRANSPORTATION KEPT YOU FROM MEDICAL APPOINTMENTS, MEETINGS, WORK OR FROM GETTING THINGS NEEDED FOR DAILY LIVING?: NO

## 2023-06-15 SDOH — HEALTH STABILITY: MENTAL HEALTH
STRESS IS WHEN SOMEONE FEELS TENSE, NERVOUS, ANXIOUS, OR CAN'T SLEEP AT NIGHT BECAUSE THEIR MIND IS TROUBLED. HOW STRESSED ARE YOU?: TO SOME EXTENT

## 2023-06-15 SDOH — ECONOMIC STABILITY: INCOME INSECURITY: IN THE LAST 12 MONTHS, WAS THERE A TIME WHEN YOU WERE NOT ABLE TO PAY THE MORTGAGE OR RENT ON TIME?: NO

## 2023-06-15 SDOH — ECONOMIC STABILITY: TRANSPORTATION INSECURITY
IN THE PAST 12 MONTHS, HAS LACK OF TRANSPORTATION KEPT YOU FROM MEETINGS, WORK, OR FROM GETTING THINGS NEEDED FOR DAILY LIVING?: NO

## 2023-06-15 ASSESSMENT — SOCIAL DETERMINANTS OF HEALTH (SDOH)
HOW OFTEN DO YOU HAVE SIX OR MORE DRINKS ON ONE OCCASION: NEVER
DO YOU BELONG TO ANY CLUBS OR ORGANIZATIONS SUCH AS CHURCH GROUPS UNIONS, FRATERNAL OR ATHLETIC GROUPS, OR SCHOOL GROUPS?: NO
HOW HARD IS IT FOR YOU TO PAY FOR THE VERY BASICS LIKE FOOD, HOUSING, MEDICAL CARE, AND HEATING?: NOT HARD AT ALL
HOW OFTEN DO YOU GET TOGETHER WITH FRIENDS OR RELATIVES?: ONCE A WEEK
DO YOU BELONG TO ANY CLUBS OR ORGANIZATIONS SUCH AS CHURCH GROUPS UNIONS, FRATERNAL OR ATHLETIC GROUPS, OR SCHOOL GROUPS?: NO
IN A TYPICAL WEEK, HOW MANY TIMES DO YOU TALK ON THE PHONE WITH FAMILY, FRIENDS, OR NEIGHBORS?: THREE TIMES A WEEK
HOW OFTEN DO YOU ATTENT MEETINGS OF THE CLUB OR ORGANIZATION YOU BELONG TO?: PATIENT DECLINED
HOW OFTEN DO YOU HAVE A DRINK CONTAINING ALCOHOL: MONTHLY OR LESS
IN A TYPICAL WEEK, HOW MANY TIMES DO YOU TALK ON THE PHONE WITH FAMILY, FRIENDS, OR NEIGHBORS?: THREE TIMES A WEEK
HOW OFTEN DO YOU ATTEND CHURCH OR RELIGIOUS SERVICES?: PATIENT DECLINED
HOW OFTEN DO YOU ATTEND CHURCH OR RELIGIOUS SERVICES?: PATIENT DECLINED
HOW OFTEN DO YOU ATTENT MEETINGS OF THE CLUB OR ORGANIZATION YOU BELONG TO?: PATIENT DECLINED
HOW MANY DRINKS CONTAINING ALCOHOL DO YOU HAVE ON A TYPICAL DAY WHEN YOU ARE DRINKING: 1 OR 2
WITHIN THE PAST 12 MONTHS, YOU WORRIED THAT YOUR FOOD WOULD RUN OUT BEFORE YOU GOT THE MONEY TO BUY MORE: NEVER TRUE
HOW OFTEN DO YOU GET TOGETHER WITH FRIENDS OR RELATIVES?: ONCE A WEEK

## 2023-06-15 ASSESSMENT — LIFESTYLE VARIABLES
SKIP TO QUESTIONS 9-10: 1
HOW OFTEN DO YOU HAVE A DRINK CONTAINING ALCOHOL: MONTHLY OR LESS
HOW MANY STANDARD DRINKS CONTAINING ALCOHOL DO YOU HAVE ON A TYPICAL DAY: 1 OR 2
HOW OFTEN DO YOU HAVE SIX OR MORE DRINKS ON ONE OCCASION: NEVER
AUDIT-C TOTAL SCORE: 1

## 2023-06-16 ENCOUNTER — OFFICE VISIT (OUTPATIENT)
Dept: MEDICAL GROUP | Facility: PHYSICIAN GROUP | Age: 33
End: 2023-06-16
Payer: COMMERCIAL

## 2023-06-16 ENCOUNTER — HOSPITAL ENCOUNTER (EMERGENCY)
Facility: MEDICAL CENTER | Age: 33
End: 2023-06-16
Attending: EMERGENCY MEDICINE
Payer: COMMERCIAL

## 2023-06-16 VITALS
HEIGHT: 63 IN | BODY MASS INDEX: 40.57 KG/M2 | OXYGEN SATURATION: 97 % | SYSTOLIC BLOOD PRESSURE: 118 MMHG | WEIGHT: 229 LBS | RESPIRATION RATE: 16 BRPM | DIASTOLIC BLOOD PRESSURE: 82 MMHG | TEMPERATURE: 97.3 F | HEART RATE: 103 BPM

## 2023-06-16 VITALS
BODY MASS INDEX: 40.86 KG/M2 | SYSTOLIC BLOOD PRESSURE: 143 MMHG | WEIGHT: 230.6 LBS | OXYGEN SATURATION: 98 % | RESPIRATION RATE: 22 BRPM | TEMPERATURE: 98.3 F | HEIGHT: 63 IN | HEART RATE: 64 BPM | DIASTOLIC BLOOD PRESSURE: 85 MMHG

## 2023-06-16 DIAGNOSIS — K58.0 IRRITABLE BOWEL SYNDROME WITH DIARRHEA: ICD-10-CM

## 2023-06-16 DIAGNOSIS — R11.2 VOMITING WITH NAUSEA, NOT INTRACTABLE: ICD-10-CM

## 2023-06-16 DIAGNOSIS — R11.2 NAUSEA AND VOMITING, UNSPECIFIED VOMITING TYPE: ICD-10-CM

## 2023-06-16 LAB
ALBUMIN SERPL BCP-MCNC: 4.9 G/DL (ref 3.2–4.9)
ALBUMIN/GLOB SERPL: 1.6 G/DL
ALP SERPL-CCNC: 74 U/L (ref 30–99)
ALT SERPL-CCNC: 16 U/L (ref 2–50)
ANION GAP SERPL CALC-SCNC: 13 MMOL/L (ref 7–16)
APPEARANCE UR: CLEAR
AST SERPL-CCNC: 19 U/L (ref 12–45)
BASOPHILS # BLD AUTO: 0.5 % (ref 0–1.8)
BASOPHILS # BLD: 0.05 K/UL (ref 0–0.12)
BILIRUB SERPL-MCNC: 0.7 MG/DL (ref 0.1–1.5)
BILIRUB UR QL STRIP.AUTO: ABNORMAL
BUN SERPL-MCNC: 8 MG/DL (ref 8–22)
CALCIUM ALBUM COR SERPL-MCNC: 9.4 MG/DL (ref 8.5–10.5)
CALCIUM SERPL-MCNC: 10.1 MG/DL (ref 8.4–10.2)
CHLORIDE SERPL-SCNC: 103 MMOL/L (ref 96–112)
CO2 SERPL-SCNC: 23 MMOL/L (ref 20–33)
COLOR UR: YELLOW
CREAT SERPL-MCNC: 0.73 MG/DL (ref 0.5–1.4)
EKG IMPRESSION: NORMAL
EOSINOPHIL # BLD AUTO: 0.08 K/UL (ref 0–0.51)
EOSINOPHIL NFR BLD: 0.7 % (ref 0–6.9)
ERYTHROCYTE [DISTWIDTH] IN BLOOD BY AUTOMATED COUNT: 43.8 FL (ref 35.9–50)
GFR SERPLBLD CREATININE-BSD FMLA CKD-EPI: 110 ML/MIN/1.73 M 2
GLOBULIN SER CALC-MCNC: 3 G/DL (ref 1.9–3.5)
GLUCOSE SERPL-MCNC: 89 MG/DL (ref 65–99)
GLUCOSE UR STRIP.AUTO-MCNC: NEGATIVE MG/DL
HCG SERPL QL: NEGATIVE
HCT VFR BLD AUTO: 46.1 % (ref 37–47)
HGB BLD-MCNC: 15.3 G/DL (ref 12–16)
IMM GRANULOCYTES # BLD AUTO: 0.04 K/UL (ref 0–0.11)
IMM GRANULOCYTES NFR BLD AUTO: 0.4 % (ref 0–0.9)
KETONES UR STRIP.AUTO-MCNC: >=80 MG/DL
LEUKOCYTE ESTERASE UR QL STRIP.AUTO: NEGATIVE
LIPASE SERPL-CCNC: 34 U/L (ref 7–58)
LYMPHOCYTES # BLD AUTO: 2 K/UL (ref 1–4.8)
LYMPHOCYTES NFR BLD: 18.7 % (ref 22–41)
MCH RBC QN AUTO: 30.1 PG (ref 27–33)
MCHC RBC AUTO-ENTMCNC: 33.2 G/DL (ref 32.2–35.5)
MCV RBC AUTO: 90.6 FL (ref 81.4–97.8)
MICRO URNS: ABNORMAL
MONOCYTES # BLD AUTO: 0.4 K/UL (ref 0–0.85)
MONOCYTES NFR BLD AUTO: 3.7 % (ref 0–13.4)
NEUTROPHILS # BLD AUTO: 8.15 K/UL (ref 1.82–7.42)
NEUTROPHILS NFR BLD: 76 % (ref 44–72)
NITRITE UR QL STRIP.AUTO: NEGATIVE
NRBC # BLD AUTO: 0 K/UL
NRBC BLD-RTO: 0 /100 WBC (ref 0–0.2)
PH UR STRIP.AUTO: 6 [PH] (ref 5–8)
PLATELET # BLD AUTO: 297 K/UL (ref 164–446)
PMV BLD AUTO: 9.3 FL (ref 9–12.9)
POTASSIUM SERPL-SCNC: 3.9 MMOL/L (ref 3.6–5.5)
PROT SERPL-MCNC: 7.9 G/DL (ref 6–8.2)
PROT UR QL STRIP: NEGATIVE MG/DL
RBC # BLD AUTO: 5.09 M/UL (ref 4.2–5.4)
RBC UR QL AUTO: NEGATIVE
SODIUM SERPL-SCNC: 139 MMOL/L (ref 135–145)
SP GR UR STRIP.AUTO: 1.02
WBC # BLD AUTO: 10.7 K/UL (ref 4.8–10.8)

## 2023-06-16 PROCEDURE — 99285 EMERGENCY DEPT VISIT HI MDM: CPT

## 2023-06-16 PROCEDURE — 3079F DIAST BP 80-89 MM HG: CPT | Performed by: NURSE PRACTITIONER

## 2023-06-16 PROCEDURE — 36415 COLL VENOUS BLD VENIPUNCTURE: CPT

## 2023-06-16 PROCEDURE — 93005 ELECTROCARDIOGRAM TRACING: CPT | Performed by: EMERGENCY MEDICINE

## 2023-06-16 PROCEDURE — 85025 COMPLETE CBC W/AUTO DIFF WBC: CPT

## 2023-06-16 PROCEDURE — 700111 HCHG RX REV CODE 636 W/ 250 OVERRIDE (IP): Performed by: EMERGENCY MEDICINE

## 2023-06-16 PROCEDURE — 700105 HCHG RX REV CODE 258: Performed by: EMERGENCY MEDICINE

## 2023-06-16 PROCEDURE — 3074F SYST BP LT 130 MM HG: CPT | Performed by: NURSE PRACTITIONER

## 2023-06-16 PROCEDURE — 83690 ASSAY OF LIPASE: CPT

## 2023-06-16 PROCEDURE — 80053 COMPREHEN METABOLIC PANEL: CPT

## 2023-06-16 PROCEDURE — 81003 URINALYSIS AUTO W/O SCOPE: CPT

## 2023-06-16 PROCEDURE — 96375 TX/PRO/DX INJ NEW DRUG ADDON: CPT

## 2023-06-16 PROCEDURE — 84703 CHORIONIC GONADOTROPIN ASSAY: CPT

## 2023-06-16 PROCEDURE — 96374 THER/PROPH/DIAG INJ IV PUSH: CPT

## 2023-06-16 PROCEDURE — 99215 OFFICE O/P EST HI 40 MIN: CPT | Performed by: NURSE PRACTITIONER

## 2023-06-16 RX ORDER — HALOPERIDOL 5 MG/ML
5 INJECTION INTRAMUSCULAR ONCE
Status: COMPLETED | OUTPATIENT
Start: 2023-06-16 | End: 2023-06-16

## 2023-06-16 RX ORDER — PROMETHAZINE HYDROCHLORIDE 25 MG/1
25 SUPPOSITORY RECTAL EVERY 6 HOURS PRN
Qty: 5 SUPPOSITORY | Refills: 0 | Status: SHIPPED | OUTPATIENT
Start: 2023-06-16 | End: 2024-01-25

## 2023-06-16 RX ORDER — ONDANSETRON 4 MG/1
8 TABLET, FILM COATED ORAL EVERY 8 HOURS PRN
Qty: 20 TABLET | Refills: 0 | Status: CANCELLED | OUTPATIENT
Start: 2023-06-16

## 2023-06-16 RX ORDER — ONDANSETRON 8 MG/1
TABLET, ORALLY DISINTEGRATING ORAL
COMMUNITY
Start: 2023-05-11 | End: 2023-06-16 | Stop reason: SDUPTHER

## 2023-06-16 RX ORDER — OMEPRAZOLE 40 MG/1
40 CAPSULE, DELAYED RELEASE ORAL 2 TIMES DAILY
Qty: 28 CAPSULE | Refills: 0 | Status: SHIPPED | OUTPATIENT
Start: 2023-06-16 | End: 2023-06-22 | Stop reason: SDUPTHER

## 2023-06-16 RX ORDER — DICYCLOMINE HCL 20 MG
20 TABLET ORAL 4 TIMES DAILY PRN
Qty: 20 TABLET | Refills: 0 | Status: SHIPPED | OUTPATIENT
Start: 2023-06-16 | End: 2024-01-25

## 2023-06-16 RX ORDER — PROMETHAZINE HYDROCHLORIDE 25 MG/1
25 TABLET ORAL EVERY 6 HOURS PRN
Qty: 15 TABLET | Refills: 0 | Status: SHIPPED | OUTPATIENT
Start: 2023-06-16 | End: 2024-01-25

## 2023-06-16 RX ORDER — ONDANSETRON 8 MG/1
8 TABLET, ORALLY DISINTEGRATING ORAL EVERY 12 HOURS PRN
Qty: 15 TABLET | Refills: 0 | Status: SHIPPED | OUTPATIENT
Start: 2023-06-16 | End: 2024-01-25

## 2023-06-16 RX ORDER — ONDANSETRON 2 MG/ML
4 INJECTION INTRAMUSCULAR; INTRAVENOUS ONCE
Status: COMPLETED | OUTPATIENT
Start: 2023-06-16 | End: 2023-06-16

## 2023-06-16 RX ORDER — SODIUM CHLORIDE, SODIUM LACTATE, POTASSIUM CHLORIDE, CALCIUM CHLORIDE 600; 310; 30; 20 MG/100ML; MG/100ML; MG/100ML; MG/100ML
1000 INJECTION, SOLUTION INTRAVENOUS ONCE
Status: COMPLETED | OUTPATIENT
Start: 2023-06-16 | End: 2023-06-16

## 2023-06-16 RX ORDER — DIPHENHYDRAMINE HYDROCHLORIDE 50 MG/ML
25 INJECTION INTRAMUSCULAR; INTRAVENOUS ONCE
Status: COMPLETED | OUTPATIENT
Start: 2023-06-16 | End: 2023-06-16

## 2023-06-16 RX ADMIN — SODIUM CHLORIDE, POTASSIUM CHLORIDE, SODIUM LACTATE AND CALCIUM CHLORIDE 1000 ML: 600; 310; 30; 20 INJECTION, SOLUTION INTRAVENOUS at 11:15

## 2023-06-16 RX ADMIN — ONDANSETRON 4 MG: 2 INJECTION INTRAMUSCULAR; INTRAVENOUS at 11:10

## 2023-06-16 RX ADMIN — DIPHENHYDRAMINE HYDROCHLORIDE 25 MG: 50 INJECTION, SOLUTION INTRAMUSCULAR; INTRAVENOUS at 11:10

## 2023-06-16 RX ADMIN — HALOPERIDOL LACTATE 5 MG: 5 INJECTION, SOLUTION INTRAMUSCULAR at 11:11

## 2023-06-16 ASSESSMENT — ENCOUNTER SYMPTOMS
SHORTNESS OF BREATH: 0
NERVOUS/ANXIOUS: 1
EYES NEGATIVE: 1
FEVER: 0
NEUROLOGICAL NEGATIVE: 1
CONSTITUTIONAL NEGATIVE: 1
COUGH: 0
ABDOMINAL PAIN: 1
VOMITING: 1
PALPITATIONS: 0
NAUSEA: 1
MUSCULOSKELETAL NEGATIVE: 1
DIARRHEA: 1
SPUTUM PRODUCTION: 0

## 2023-06-16 ASSESSMENT — PATIENT HEALTH QUESTIONNAIRE - PHQ9
SUM OF ALL RESPONSES TO PHQ9 QUESTIONS 1 AND 2: 6
7. TROUBLE CONCENTRATING ON THINGS, SUCH AS READING THE NEWSPAPER OR WATCHING TELEVISION: NEARLY EVERY DAY
6. FEELING BAD ABOUT YOURSELF - OR THAT YOU ARE A FAILURE OR HAVE LET YOURSELF OR YOUR FAMILY DOWN: NEARLY EVERY DAY
1. LITTLE INTEREST OR PLEASURE IN DOING THINGS: NEARLY EVERY DAY
3. TROUBLE FALLING OR STAYING ASLEEP OR SLEEPING TOO MUCH: NOT AT ALL
8. MOVING OR SPEAKING SO SLOWLY THAT OTHER PEOPLE COULD HAVE NOTICED. OR THE OPPOSITE, BEING SO FIGETY OR RESTLESS THAT YOU HAVE BEEN MOVING AROUND A LOT MORE THAN USUAL: NOT AT ALL
SUM OF ALL RESPONSES TO PHQ QUESTIONS 1-9: 18
5. POOR APPETITE OR OVEREATING: NEARLY EVERY DAY
9. THOUGHTS THAT YOU WOULD BE BETTER OFF DEAD, OR OF HURTING YOURSELF: NOT AT ALL
2. FEELING DOWN, DEPRESSED, IRRITABLE, OR HOPELESS: NEARLY EVERY DAY
4. FEELING TIRED OR HAVING LITTLE ENERGY: NEARLY EVERY DAY

## 2023-06-16 ASSESSMENT — FIBROSIS 4 INDEX
FIB4 SCORE: 1.09
FIB4 SCORE: 1.09

## 2023-06-16 NOTE — ED PROVIDER NOTES
CHIEF COMPLAINT  Chief Complaint   Patient presents with    Abdominal Pain     Pt c/o ABD Pn - upper, started a couple wks ago; Hx of IBS - has chronic diarrhea;    N/V     Pt stated that had 1x emesis today, noted blood in emesis this past Tuesday, Pt denied taking any blood thinners;       LIMITATION TO HISTORY   Select: None    HPI    Earlene Parks is a 33 y.o. female who presents to the Emergency Department complaining of nausea vomiting some blood in her vomitus diarrhea abdominal pain.  The patient has a history of IBS she did have an upper and lower endoscopy in the past has been diagnosed with this and living with it for the past several years.  Patient states that she has been using a virtual type physician over the past 6 months in which she started having just worsening syndromes of abdominal cramps diarrhea and vomiting.  She states the last 2 weeks she has been vomiting rather continuously.  Patient saw her primary provider today who sent her to the emergency department because she stated that she had had a bout of bloody vomit is just described as stringy blood on Tuesday and then another bout last night.  Patient is here complaining of discontinuous abdominal cramps she has been vomiting daily for the past several weeks.  She did have some dark stools about a week ago but currently just has diarrhea that is not unusual for her.  Patient is here for evaluation describes abdominal cramps describes the nausea vomiting she states that she had blood in her vomit last night but today is just been yellow in nature and diarrhea as described above denies any fevers or any other symptoms.  Patient does relate that she is a daily smoker of marijuana as well to help with her nausea.  She has been doing that for some time as well.    OUTSIDE HISTORIAN(S):  Select: None    EXTERNAL RECORDS REVIEWED  Select: Other patient was seen by the primary to today and recommended follow-up in the emergency department  for further evaluation.      PAST MEDICAL HISTORY  Past Medical History:   Diagnosis Date    Depression     IBS (irritable bowel syndrome)     Migraine     Severe cervical dysplasia, histologically confirmed 2017    hpv     .    SURGICAL HISTORY  Past Surgical History:   Procedure Laterality Date    HYSTEROSCOPY THERMAL ABLATION N/A 2016    Procedure: HYSTEROSCOPY THERMAL ABLATION;  Surgeon: Bill Lane M.D.;  Location: SURGERY [facility];  Service:     MAMMOPLASTY REDUCTION  2012    Performed by VIVIEN HERRERA at SURGERY [facility]    TUBAL COAGULATION LAPAROSCOPIC BILATERAL  2011    Performed by BILL LANE at SURGERY [facility]    CASSANDRA BY LAPAROSCOPY  2011    Performed by RHONA JOAQUIN at SURGERY [facility]    DILATION AND CURETTAGE CONE BIOPSY      Dr Savage    TONSILLECTOMY AND ADENOIDECTOMY      EAR MIDDLE EXPLORATION      ear tubes    LEEP           FAMILY HISTORY  Family History   Problem Relation Age of Onset    Cancer Mother         cervical    ADD / ADHD Mother     Depression Mother     Anxiety disorder Mother     Diabetes Father     Cancer Maternal Grandmother         lung    Cancer Paternal Grandfather         melanoma    Other Sister         PCOS    Thyroid Sister     ADD / ADHD Brother     Other Sister         PCOS    Anxiety disorder Son     Other Son     No Known Problems Brother           SOCIAL HISTORY  Social History     Socioeconomic History    Marital status:      Spouse name: Not on file    Number of children: 1S    Years of education: HS    Highest education level: Associate degree: occupational, technical, or vocational program   Occupational History    Occupation:      Employer: [employer]   Tobacco Use    Smoking status: Former     Years: 10.00     Types: Cigarettes     Quit date:      Years since quittin.4    Smokeless tobacco: Former    Tobacco comments:     on and off for ten years     Vaping Use    Vaping Use: Every day    Substances: THC   Substance and Sexual Activity    Alcohol use: Not Currently     Alcohol/week: 1.2 oz     Types: 2 Standard drinks or equivalent per week    Drug use: Yes     Frequency: 7.0 times per week     Types: Marijuana, Inhaled     Comment: marijuana    Sexual activity: Yes     Partners: Male     Birth control/protection: Condom, Surgical   Other Topics Concern    Not on file   Social History Narrative     for [employer]           Social Determinants of Health     Financial Resource Strain: Low Risk  (6/15/2023)    Overall Financial Resource Strain (CARDIA)     Difficulty of Paying Living Expenses: Not hard at all   Food Insecurity: No Food Insecurity (6/15/2023)    Hunger Vital Sign     Worried About Running Out of Food in the Last Year: Never true     Ran Out of Food in the Last Year: Never true   Transportation Needs: No Transportation Needs (6/15/2023)    PRAPARE - Transportation     Lack of Transportation (Medical): No     Lack of Transportation (Non-Medical): No   Physical Activity: Sufficiently Active (6/15/2023)    Exercise Vital Sign     Days of Exercise per Week: 3 days     Minutes of Exercise per Session: 60 min   Stress: Stress Concern Present (6/15/2023)    [employer] [place] [employer] - Occupational Stress Questionnaire     Feeling of Stress : To some extent   Social Connections: Unknown (6/15/2023)    Social Connection and Isolation Panel [NHANES]     Frequency of Communication with Friends and Family: Three times a week     Frequency of Social Gatherings with Friends and Family: Once a week     Attends Holiness Services: Patient refused     Active Member of Clubs or Organizations: No     Attends Club or Organization Meetings: Patient refused     Marital Status:    Intimate Partner Violence: Not on file   Housing Stability: Unknown (6/15/2023)    Housing Stability Vital Sign     Unable to Pay for Housing in the Last  "Year: No     Number of Places Lived in the Last Year: Not on file     Unstable Housing in the Last Year: No         CURRENT MEDICATIONS  No current facility-administered medications on file prior to encounter.     Current Outpatient Medications on File Prior to Encounter   Medication Sig Dispense Refill    ondansetron (ZOFRAN ODT) 8 MG TABLET DISPERSIBLE Take 1 Tablet by mouth every 12 hours as needed for Nausea. 15 Tablet 0    ibuprofen (MOTRIN) 200 MG Tab Take 800 mg by mouth every 6 hours as needed. Indications: Pain             ALLERGIES  Allergies   Allergen Reactions    Nkda [No Known Drug Allergy]        PHYSICAL EXAM  VITAL SIGNS:BP (!) 143/85   Pulse 64   Temp 36.8 °C (98.3 °F) (Temporal)   Resp (!) 22   Ht 1.6 m (5' 3\")   Wt 105 kg (230 lb 9.6 oz)   SpO2 98%   BMI 40.85 kg/m²     Constitutional: Well-developed no acute distress   HENT: Normocephalic, Atraumatic, Bilateral external ears normal.  Eyes:  conjunctiva are normal.   Neck: Supple.  Nontender midline  Cardiovascular: Regular rate and rhythm without murmurs gallops or rubs.   Thorax & Lungs: No respiratory distress. Breathing comfortably. Lungs are clear to auscultation bilaterally, there are no wheezes no rales. Chest wall is nontender.  Abdomen: Soft, mild tenderness diffusely but no rebound tenderness bowel sounds are hyperactive.  Skin: Warm, Dry, No erythema,   Back: No tenderness, No CVA tenderness.  Musculoskeletal: No clubbing cyanosis or edema good range of motion   Neurologic: Alert & oriented x 3, normal sensation moving all extremities appears normal   Psychiatric: Affect normal, Judgment normal, Mood normal.       DIAGNOSTIC STUDIES / PROCEDURES  EKG  I have independently interpreted this EKG  Interpreted below by myself as     LABS  Results for orders placed or performed during the hospital encounter of 06/16/23   CBC WITH DIFFERENTIAL   Result Value Ref Range    WBC 10.7 4.8 - 10.8 K/uL    RBC 5.09 4.20 - 5.40 M/uL    " Hemoglobin 15.3 12.0 - 16.0 g/dL    Hematocrit 46.1 37.0 - 47.0 %    MCV 90.6 81.4 - 97.8 fL    MCH 30.1 27.0 - 33.0 pg    MCHC 33.2 32.2 - 35.5 g/dL    RDW 43.8 35.9 - 50.0 fL    Platelet Count 297 164 - 446 K/uL    MPV 9.3 9.0 - 12.9 fL    Neutrophils-Polys 76.00 (H) 44.00 - 72.00 %    Lymphocytes 18.70 (L) 22.00 - 41.00 %    Monocytes 3.70 0.00 - 13.40 %    Eosinophils 0.70 0.00 - 6.90 %    Basophils 0.50 0.00 - 1.80 %    Immature Granulocytes 0.40 0.00 - 0.90 %    Nucleated RBC 0.00 0.00 - 0.20 /100 WBC    Neutrophils (Absolute) 8.15 (H) 1.82 - 7.42 K/uL    Lymphs (Absolute) 2.00 1.00 - 4.80 K/uL    Monos (Absolute) 0.40 0.00 - 0.85 K/uL    Eos (Absolute) 0.08 0.00 - 0.51 K/uL    Baso (Absolute) 0.05 0.00 - 0.12 K/uL    Immature Granulocytes (abs) 0.04 0.00 - 0.11 K/uL    NRBC (Absolute) 0.00 K/uL   COMP METABOLIC PANEL   Result Value Ref Range    Sodium 139 135 - 145 mmol/L    Potassium 3.9 3.6 - 5.5 mmol/L    Chloride 103 96 - 112 mmol/L    Co2 23 20 - 33 mmol/L    Anion Gap 13.0 7.0 - 16.0    Glucose 89 65 - 99 mg/dL    Bun 8 8 - 22 mg/dL    Creatinine 0.73 0.50 - 1.40 mg/dL    Calcium 10.1 8.4 - 10.2 mg/dL    AST(SGOT) 19 12 - 45 U/L    ALT(SGPT) 16 2 - 50 U/L    Alkaline Phosphatase 74 30 - 99 U/L    Total Bilirubin 0.7 0.1 - 1.5 mg/dL    Albumin 4.9 3.2 - 4.9 g/dL    Total Protein 7.9 6.0 - 8.2 g/dL    Globulin 3.0 1.9 - 3.5 g/dL    A-G Ratio 1.6 g/dL   LIPASE   Result Value Ref Range    Lipase 34 7 - 58 U/L   URINALYSIS,CULTURE IF INDICATED    Specimen: Urine, Clean Catch; Blood   Result Value Ref Range    Color Yellow     Character Clear     Specific Gravity 1.025 <1.035    Ph 6.0 5.0 - 8.0    Glucose Negative Negative mg/dL    Ketones >=80 (A) Negative mg/dL    Protein Negative Negative mg/dL    Bilirubin Small (A) Negative    Nitrite Negative Negative    Leukocyte Esterase Negative Negative    Occult Blood Negative Negative    Micro Urine Req see below    HCG QUAL SERUM   Result Value Ref Range     Beta-Hcg Qualitative Serum Negative Negative   CORRECTED CALCIUM   Result Value Ref Range    Correct Calcium 9.4 8.5 - 10.5 mg/dL   ESTIMATED GFR   Result Value Ref Range    GFR (CKD-EPI) 110 >60 mL/min/1.73 m 2   EKG (NOW)   Result Value Ref Range    Report       [facility]    Test Date:  2023  Pt Name:    SHIRLENE HI                Department: EDS  MRN:        [id number]                      [facility]  Gender:     Female                       Technician: gabe  :        1990                   Requested By:WANDY SWANSON  Order #:    [id number]                    Reading MD: WANDY SWANSON MD    Measurements  Intervals                                Axis  Rate:       70                           P:          40  OH:         162                          QRS:        2  QRSD:       103                          T:          13  QT:         423  QTc:        457    Interpretive Statements  Sinus rhythm  Low voltage, precordial leads  Compared to ECG 2021 18:30:57  Low QRS voltage now present  Sinus bradycardia no longer present  Otherwise normal ECG  Electronically Signed On 2023 12:05:08 PDT by WANDY SWANSON MD           RADIOLOGY    COURSE & MEDICAL DECISION MAKING    ED COURSE:    ED Observation Status? Yes;I am placing the patient in to an observation status due to a diagnostic uncertainty as well as therapeutic intensity. Patient placed in observation status at 10:40 AM 2023    Observation plan is as follows: Patient presents with abdominal pain and describing hematemesis.  At this point I will start the treatment with IV fluids nausea medications as well as Haldol and Benadryl because she may have cannabinoid hyperemesis syndrome in conjunction with her IBS.  And evaluate for an elevated white blood cell count H&H platelet count liver enzymes as well.  We will observe for resolution of her symptoms with the above  treatment.    INTERVENTIONS BY ME:  Medications   lactated ringers (LR) bolus (0 mL Intravenous Stopped 6/16/23 1212)   diphenhydrAMINE (BENADRYL) injection 25 mg (25 mg Intravenous Given 6/16/23 1110)   haloperidol lactate (HALDOL) injection 5 mg (5 mg Intravenous Given 6/16/23 1111)   ondansetron (ZOFRAN) syringe/vial injection 4 mg (4 mg Intravenous Given 6/16/23 1110)       Response on recheck: Improved.      I have discussed management of the patient with the following physicians and sources:   None    Patient discharged from ED observation at 12:07 PM 06/16/23  .      INITIAL ASSESSMENT, COURSE AND PLAN  Care Narrative: Patient presents emerged part for evaluation of her nausea vomiting.  I did do laboratory studies her white blood cell count is normal and electrolytes are normal as well.  Urinalysis is also normal.  I did start the patient with IV fluid bolus Benadryl and Haldol.  The patient does state that she smokes marijuana pretty much daily to help with her nausea.  I did describe to her about the possibility of cannabinoid hyperemesis syndrome that might be causing some of her symptomatology however she does also have a history of IBS.  At this point I will give the patient a prescription of Phenergan for nausea control as well as Bentyl for her abdominal cramps for IBS I recommended for her a diet diet change.  She did state that she had some blood in her vomitus from the standpoint of volume I am not very concerned she has no evidence of bleeding at this time so most likely its either esophagitis or gastritis I will start her on Prilosec for this and have recommended for her to follow-up with a gastroenterologist of which she has an appointment in August as well as her primary care physician for further outpatient treatment and care.  At this point I feel the patient is stable for discharge she is feeling much improved.      HYDRATION: Based on the patient's presentation of hydration nausea vomiting,   the patient was given IV fluids. IV Hydration was used because oral hydration is unable to be done due to the patient's symptoms. Upon recheck following hydration, the patient was improved.            MEDICAL DECISION MAKING:  PROBLEMS EVALUATED THIS VISIT:  Nausea vomiting  Hematemesis  Abdominal pain irritable bowel syndrome    RISK:  Patient presents with the above symptomatology especially with hematemesis so did require significant work-up for evaluation of this    Diagnostic tests and prescription drugs considered including, but not limited to: Select: CT scan was considered if there was signs of an infection such as an elevated white blood cell count or worsening symptomatology despite treatment however the treatment did seem to help and I do not feel CT scan is necessary at this time..      FINAL DIAGNOSIS  1. Nausea and vomiting, unspecified vomiting type    2. Irritable bowel syndrome with diarrhea        The patient will return for new or worsening symptoms and is stable at the time of discharge.    The patient is referred to a primary physician for blood pressure management, diabetic screening, and for all other preventative health concerns.        DISPOSITION:  Patient will be discharged home in stable condition.    FOLLOW UP:  Trista Torres, BULMARO.P.R.N.  [address] [address]  [phone]    Schedule an appointment as soon as possible for a visit   For re-check, Return if any symptoms worsen      OUTPATIENT MEDICATIONS:  Discharge Medication List as of 6/16/2023 12:12 PM        START taking these medications    Details   promethazine (PHENERGAN) 25 MG Tab Take 1 Tablet by mouth every 6 hours as needed for Nausea/Vomiting., Disp-15 Tablet, R-0, Normal      promethazine (PHENERGAN) 25 MG Suppos Insert 1 Suppository into the rectum every 6 hours as needed for Nausea/Vomiting., Disp-5 Suppository, R-0, Normal      dicyclomine (BENTYL) 20 MG Tab Take 1 Tablet by mouth 4 times a day  as needed (abdominal cramps)., Disp-20 Tablet, R-0, Normal      omeprazole (PRILOSEC) 40 MG delayed-release capsule Take 1 Capsule by mouth 2 times a day for 14 days., Disp-28 Capsule, R-0, Normal                          Electronically signed by: Harry Coffey M.D.,4:25 PM 06/16/23

## 2023-06-16 NOTE — ED NOTES
Pt independently ambulated from waiting room to ER-5 - steady gait noted, then taking to restroom to try to collect UA;

## 2023-06-16 NOTE — ED TRIAGE NOTES
"Chief Complaint   Patient presents with    Abdominal Pain     Pt c/o ABD Pn - upper, started a couple wks ago; Hx of IBS - has chronic diarrhea;    N/V     Pt stated that had 1x emesis today, noted blood in emesis this past Tuesday, Pt denied taking any blood thinners;      ED Triage Vitals [06/16/23 1005]   Enc Vitals Group      Blood Pressure (Abnormal) 154/101      Pulse 99      Respiration 20      Temperature 36.9 °C (98.4 °F)      Temp src Temporal      Pulse Oximetry 98 %      Weight 105 kg (230 lb 9.6 oz)      Height 1.6 m (5' 3\")      Head Circumference       Peak Flow       Pain Score       Pain Loc       Pain Edu?       Excl. in GC?        "

## 2023-06-16 NOTE — PROGRESS NOTES
Subjective       CC:    Chief Complaint   Patient presents with    Establish Care     Pt states that she has been vomiting a lot since October. Been much worse since march. 3-4 times a week. Pt has seen gi and changed her meds.   Pt has started to get a lot of anxiety about it.           HISTORY OF THE PRESENT ILLNESS: Patient is a 33 y.o. female established patient of [facility]ILCAR Torres. Here today with complaints of vomiting, nausea, diarrhea x3 months. She has a medical history of IBS, diagnosed in 2018. States has not seen GI in a few years, her diarrhea usually resolved within a few days/weeks. The past 3 months diarrhea with vomiting has worsened with nausea. States causing her to have anxiety. States she called to schedule appointment with her PCP and was told no longer at this clinic so she decided to utilize telemed offered by her insurance. She got a few pills of zofran which helped with her nausea. She noticed strings of blood with vomit on Wednesday 6/14/2023 and some dark flecks in her stool on 6/12/2023. No fever or dizziness reported. She has abdominal cramping with vomiting and diarrhea.   [facility] and has appointment with GI on 8/31/2023.     Patient Active Problem List   Diagnosis    IBS (irritable bowel syndrome)    Migraine    Perioral dermatitis    Cyst of left ovary    History of attention deficit hyperactivity disorder (ADHD)    RADHA (generalized anxiety disorder)    Current moderate episode of major depressive disorder without prior episode (HCC)    Obesity (BMI 30-39.9)       Past Medical History:   Diagnosis Date    Depression     IBS (irritable bowel syndrome)     Migraine     Severe cervical dysplasia, histologically confirmed 2017    hpv        Current Facility-Administered Medications Ordered in Epic   Medication Dose Route Frequency Provider Last Rate Last Admin    lactated ringers (LR) bolus  1,000 mL Intravenous Once Harry Coffey M.D.         diphenhydrAMINE (BENADRYL) injection 25 mg  25 mg Intravenous Once Harry Coffey M.D.        haloperidol lactate (HALDOL) injection 5 mg  5 mg Intravenous Once Harry Coffey M.D.        ondansetron (ZOFRAN) syringe/vial injection 4 mg  4 mg Intravenous Once Harry Coffey M.D.         Current Outpatient Medications Ordered in Epic   Medication Sig Dispense Refill    ondansetron (ZOFRAN ODT) 8 MG TABLET DISPERSIBLE Take 1 Tablet by mouth every 12 hours as needed for Nausea. 15 Tablet 0    ibuprofen (MOTRIN) 200 MG Tab Take 800 mg by mouth every 6 hours as needed. Indications: Pain          Past Surgical History:   Procedure Laterality Date    HYSTEROSCOPY THERMAL ABLATION N/A 8/20/2016    Procedure: HYSTEROSCOPY THERMAL ABLATION;  Surgeon: Bill Lane M.D.;  Location: [facility];  Service:     MAMMOPLASTY REDUCTION  8/29/2012    Performed by VIVIEN HERRERA at [facility]    TUBAL COAGULATION LAPAROSCOPIC BILATERAL  12/27/2011    Performed by BILL LANE at [facility]    CASSANDRA BY LAPAROSCOPY  9/7/2011    Performed by RHONA JOAQUIN at [facility]    DILATION AND CURETTAGE CONE BIOPSY  2008    Dr Savage    TONSILLECTOMY AND ADENOIDECTOMY  1999    EAR MIDDLE EXPLORATION      ear tubes    LEEP          Allergies:  Nkda [no known drug allergy]    ROS:   Review of Systems   Constitutional: Negative.  Negative for fever and malaise/fatigue.   HENT: Negative.     Eyes: Negative.    Respiratory:  Negative for cough, sputum production and shortness of breath.    Cardiovascular:  Negative for chest pain, palpitations and leg swelling.   Gastrointestinal:  Positive for abdominal pain, diarrhea, nausea and vomiting.   Genitourinary: Negative.    Musculoskeletal: Negative.    Neurological: Negative.    Endo/Heme/Allergies: Negative.    Psychiatric/Behavioral:  The patient is nervous/anxious.        Objective       Exam: /82   Pulse (!) 103   Temp  "36.3 °C (97.3 °F) (Temporal)   Resp 16   Ht 1.6 m (5' 3\")   Wt 104 kg (229 lb)   SpO2 97%  Body mass index is 40.57 kg/m².    Physical Exam  Constitutional:       Appearance: Normal appearance. She is obese.   Cardiovascular:      Rate and Rhythm: Normal rate and regular rhythm.      Pulses: Normal pulses.      Heart sounds: Normal heart sounds.   Pulmonary:      Effort: Pulmonary effort is normal.      Breath sounds: Normal breath sounds.   Abdominal:      General: There is no distension.      Palpations: Abdomen is soft. There is no mass.      Tenderness: There is no abdominal tenderness. There is right CVA tenderness. There is no left CVA tenderness, guarding or rebound.   Musculoskeletal:      Right lower leg: No edema.      Left lower leg: No edema.   Skin:     General: Skin is warm and dry.   Neurological:      Mental Status: She is alert.         Assessment & Plan     33 y.o. female with the following -  1. Vomiting with nausea, not intractable  2. Irritable bowel syndrome with diarrhea  32 y/o female with hx of IBS here with recurrent N/V/D the past 3 months; s/p cholecystectomy 2011. Had dark flecks in stool, blood strings in vomit a few days ago. Concern for inflammatory IBS, recommend she get seen in ER for IV hydration and imaging; with GI consult for possible scope. States she will take this into consideration. Had relief with zofran via telemed provider through her insurance [employer]; will refill in interim. She is overdue for annual labs/preventative screens and will see her PCP for this. Confirmed with her that [facility] Torres still in practice here and patient would like to stay with current PCP. She has appointment with GI in 8/2023. I have asked her to call and move appointment up. Can also reach out to digestive health to see if they have any earlier openings.     - ondansetron (ZOFRAN ODT) 8 MG TABLET DISPERSIBLE; Take 1 Tablet by mouth every 12 hours as needed for Nausea.  Dispense: 15 Tablet; " Refill: 0    Educated in proper administration of medication(s) ordered today including safety, possible SE, risks, benefits, rationale and alternatives to therapy.     Return if symptoms worsen or fail to improve.    Please note that this dictation was created using voice recognition software. I have made every reasonable attempt to correct obvious errors, but I expect that there are errors of grammar and possibly content that I did not discover before finalizing the note.

## 2023-06-16 NOTE — ED NOTES
PIV placed, blood work collected & sent to lab, Pt denied having any needs at this time, no distress noted;    Pt aware that she is waiting to see ERP;    Pt asked to not eat and/or drink until we get clearance from ERP - Pt verbalized understanding;

## 2023-06-22 ENCOUNTER — OFFICE VISIT (OUTPATIENT)
Dept: MEDICAL GROUP | Facility: PHYSICIAN GROUP | Age: 33
End: 2023-06-22
Payer: COMMERCIAL

## 2023-06-22 VITALS
HEIGHT: 63 IN | SYSTOLIC BLOOD PRESSURE: 122 MMHG | TEMPERATURE: 97.5 F | DIASTOLIC BLOOD PRESSURE: 80 MMHG | WEIGHT: 235 LBS | HEART RATE: 69 BPM | BODY MASS INDEX: 41.64 KG/M2 | OXYGEN SATURATION: 99 %

## 2023-06-22 DIAGNOSIS — F41.1 GAD (GENERALIZED ANXIETY DISORDER): ICD-10-CM

## 2023-06-22 DIAGNOSIS — R11.2 NAUSEA AND VOMITING, UNSPECIFIED VOMITING TYPE: ICD-10-CM

## 2023-06-22 DIAGNOSIS — E66.9 OBESITY (BMI 30-39.9): ICD-10-CM

## 2023-06-22 DIAGNOSIS — Z11.59 NEED FOR HEPATITIS C SCREENING TEST: ICD-10-CM

## 2023-06-22 DIAGNOSIS — K58.9 IRRITABLE BOWEL SYNDROME, UNSPECIFIED TYPE: ICD-10-CM

## 2023-06-22 PROCEDURE — 3079F DIAST BP 80-89 MM HG: CPT | Performed by: NURSE PRACTITIONER

## 2023-06-22 PROCEDURE — 99214 OFFICE O/P EST MOD 30 MIN: CPT | Performed by: NURSE PRACTITIONER

## 2023-06-22 PROCEDURE — 3074F SYST BP LT 130 MM HG: CPT | Performed by: NURSE PRACTITIONER

## 2023-06-22 RX ORDER — OMEPRAZOLE 40 MG/1
40 CAPSULE, DELAYED RELEASE ORAL 2 TIMES DAILY
Qty: 180 CAPSULE | Refills: 0 | Status: SHIPPED | OUTPATIENT
Start: 2023-06-22 | End: 2024-01-25

## 2023-06-22 RX ORDER — FLUOXETINE 10 MG/1
10 CAPSULE ORAL DAILY
Qty: 30 CAPSULE | Refills: 2 | Status: SHIPPED | OUTPATIENT
Start: 2023-06-22 | End: 2023-07-24

## 2023-06-22 ASSESSMENT — ANXIETY QUESTIONNAIRES
6. BECOMING EASILY ANNOYED OR IRRITABLE: MORE THAN HALF THE DAYS
GAD7 TOTAL SCORE: 11
1. FEELING NERVOUS, ANXIOUS, OR ON EDGE: MORE THAN HALF THE DAYS
3. WORRYING TOO MUCH ABOUT DIFFERENT THINGS: MORE THAN HALF THE DAYS
5. BEING SO RESTLESS THAT IT IS HARD TO SIT STILL: SEVERAL DAYS
4. TROUBLE RELAXING: SEVERAL DAYS
7. FEELING AFRAID AS IF SOMETHING AWFUL MIGHT HAPPEN: NOT AT ALL
2. NOT BEING ABLE TO STOP OR CONTROL WORRYING: NEARLY EVERY DAY

## 2023-06-22 ASSESSMENT — FIBROSIS 4 INDEX: FIB4 SCORE: 0.56

## 2023-06-22 NOTE — ASSESSMENT & PLAN NOTE
She is followed by her psychologist. She is interested in medication. She has a stressful job. She was on fluoxetine in the past and from what she remembers she tolerated.  Her RADHA score today is 11. Denies self harm or SI today.

## 2023-06-22 NOTE — PROGRESS NOTES
Subjective:     CC: irritable bowel syndrome    HPI:   Earlene presents today with the following:    IBS (irritable bowel syndrome)  Had recent flare with nausea, vomiting and diarrhea for 3 months. Seen in ER and prescribed dicyclomine, phenergan and omeprazole. She is taking dicyclomine as needed. Has upcoming 2023 with [facility], she is on a waiting and cancellation list. She has reached out to [facility] and they are booked out as long as DHA. Her omeprazole has been helping the most. She was vomiting up bile. She is now having more formed stools. She has been having anxiety and working on it with her psychologist who also diagnosed with PDD.     RADHA (generalized anxiety disorder)  She is followed by her psychologist. She is interested in medication. She has a stressful job. She was on fluoxetine in the past and from what she remembers she tolerated.  Her RADHA score today is 11. Denies self harm or SI today.      Past Medical History:   Diagnosis Date    Depression     IBS (irritable bowel syndrome)     Migraine     Severe cervical dysplasia, histologically confirmed     hpv       Social History     Tobacco Use    Smoking status: Former     Years: 10.00     Types: Cigarettes     Quit date:      Years since quittin.4    Smokeless tobacco: Former    Tobacco comments:     on and off for ten years    Vaping Use    Vaping Use: Every day    Substances: THC   Substance Use Topics    Alcohol use: Not Currently     Alcohol/week: 1.2 oz     Types: 2 Standard drinks or equivalent per week    Drug use: Yes     Frequency: 7.0 times per week     Types: Marijuana, Inhaled     Comment: marijuana       Current Outpatient Medications Ordered in Epic   Medication Sig Dispense Refill    omeprazole (PRILOSEC) 40 MG delayed-release capsule Take 1 Capsule by mouth 2 times a day. 180 Capsule 0    FLUoxetine (PROZAC) 10 MG Cap Take 1 Capsule by mouth every day. 30 Capsule 2    ondansetron (ZOFRAN ODT) 8 MG TABLET DISPERSIBLE Take  "1 Tablet by mouth every 12 hours as needed for Nausea. 15 Tablet 0    promethazine (PHENERGAN) 25 MG Tab Take 1 Tablet by mouth every 6 hours as needed for Nausea/Vomiting. 15 Tablet 0    promethazine (PHENERGAN) 25 MG Suppos Insert 1 Suppository into the rectum every 6 hours as needed for Nausea/Vomiting. 5 Suppository 0    dicyclomine (BENTYL) 20 MG Tab Take 1 Tablet by mouth 4 times a day as needed (abdominal cramps). 20 Tablet 0    ibuprofen (MOTRIN) 200 MG Tab Take 800 mg by mouth every 6 hours as needed. Indications: Pain       No current Epic-ordered facility-administered medications on file.       Allergies:  Nkda [no known drug allergy]    Health Maintenance: Reviewed. Reports had her pap at Dr. Lane office, we will request records      Objective:     Vital signs reviewed  Exam:  /80 (BP Location: Left arm, Patient Position: Sitting, BP Cuff Size: Adult)   Pulse 69   Temp 36.4 °C (97.5 °F) (Temporal)   Ht 1.6 m (5' 3\")   Wt 107 kg (235 lb)   SpO2 99%   BMI 41.63 kg/m²  Body mass index is 41.63 kg/m².      General: Normal appearing. No distress.  Pulmonary: Clear to ausculation.  Normal effort. No rales, ronchi, or wheezing.  Cardiovascular: Regular rate and rhythm without murmur.   Abdomen: Soft, non-distended. Normal bowel sounds. Liver and spleen are not palpable. Tenderness to left lower, no rebound tenderness.  Psych: Normal mood and affect. Alert and oriented x3. Judgment and insight is normal.      Assessment & Plan:     33 y.o. female with the following -     1. Irritable bowel syndrome, unspecified type  Chronic exacerbated problem.  Keep upcoming GI appointment.  She will continue with her omeprazole 40 mg twice daily, promethazine 25 mg as needed and dicyclomine 20 mg as needed.  Recommend she continue with her food diary.  She can also consider the FODMAP diet.    2. Nausea and vomiting, unspecified vomiting type  Chronic exacerbated problem.  Symptoms have been improved with " omeprazole and she will continue.  Continue promethazine as needed.    - omeprazole (PRILOSEC) 40 MG delayed-release capsule; Take 1 Capsule by mouth 2 times a day.  Dispense: 180 Capsule; Refill: 0    3. RADHA (generalized anxiety disorder)  Chronic exacerbated problem.  Discussed and reviewed her RADHA score today.  She is interested in starting medication.  She has been having weight gain so we will start with fluoxetine as to side effects are less for weight gain with this medication.  She will return in 1 month for follow-up.  - FLUoxetine (PROZAC) 10 MG Cap; Take 1 Capsule by mouth every day.  Dispense: 30 Capsule; Refill: 2    4. Obesity (BMI 30-39.9)  Chronic exacerbated problem.  She has been noticing weight gain and temperature intolerances.  Plan to check updated labs.  - TSH WITH REFLEX TO FT4; Future  - HEMOGLOBIN A1C; Future  - Patient identified as having weight management issue.  Appropriate orders and counseling given.    5. Need for hepatitis C screening test  Acute uncomplicated problem.  Discussed screening and she is in agreement.  - HEP C VIRUS ANTIBODY; Future        Return in about 4 weeks (around 7/20/2023) for medication management, anxiety.    Please note that this dictation was created using voice recognition software. I have made every reasonable attempt to correct obvious errors, but I expect that there are errors of grammar and possibly content that I did not discover before finalizing the note.

## 2023-06-22 NOTE — ASSESSMENT & PLAN NOTE
Had recent flare with nausea, vomiting and diarrhea for 3 months. Seen in ER and prescribed dicyclomine, phenergan and omeprazole. She is taking dicyclomine as needed. Has upcoming August 31, 2023 with [facility], she is on a waiting and cancellation list. She has reached out to [facility] and they are booked out as long as DHA. Her omeprazole has been helping the most. She was vomiting up bile. She is now having more formed stools. She has been having anxiety and working on it with her psychologist who also diagnosed with PDD.

## 2023-06-22 NOTE — LETTER
[facility]  CALLY Chavarria  [address] [place] [address]  Fax: [phone]   Authorization for Release/Disclosure of   Protected Health Information   Name: EARLENE HI : 1990 SSN: [id number]   Address:  [place] [address] Phone:    [phone] (home)    I authorize the entity listed below to release/disclose the PHI below to:   [facility]/CALLY Chavarria and CALLY Chavarria   Provider or Entity Name:  Dr Lane    [facility]   Phone:  [phone]    Fax:     Reason for request: continuity of care   Information to be released:    [  ] LAST COLONOSCOPY,  including any PATH REPORT and follow-up  [  ] LAST FIT/COLOGUARD RESULT [  ] LAST DEXA  [  ] LAST MAMMOGRAM  [XX] LAST PAP  [  ] LAST LABS [  ] RETINA EXAM REPORT  [  ] IMMUNIZATION RECORDS  [  ] Release all info      [  ] Check here and initial the line next to each item to release ALL health information INCLUDING  _____ Care and treatment for drug and / or alcohol abuse  _____ HIV testing, infection status, or AIDS  _____ Genetic Testing    DATES OF SERVICE OR TIME PERIOD TO BE DISCLOSED: _____________  I understand and acknowledge that:  * This Authorization may be revoked at any time by you in writing, except if your health information has already been used or disclosed.  * Your health information that will be used or disclosed as a result of you signing this authorization could be re-disclosed by the recipient. If this occurs, your re-disclosed health information may no longer be protected by State or Federal laws.  * You may refuse to sign this Authorization. Your refusal will not affect your ability to obtain treatment.  * This Authorization becomes effective upon signing and will  on (date) __________.      If no date is indicated, this Authorization will  one (1) year from the signature date.    Name: Earlene Hi  Signature: Date:    6/22/2023     PLEASE FAX REQUESTED RECORDS BACK TO: [phone]

## 2023-07-19 ENCOUNTER — HOSPITAL ENCOUNTER (OUTPATIENT)
Dept: LAB | Facility: MEDICAL CENTER | Age: 33
End: 2023-07-19
Attending: NURSE PRACTITIONER
Payer: COMMERCIAL

## 2023-07-19 DIAGNOSIS — E66.9 OBESITY (BMI 30-39.9): ICD-10-CM

## 2023-07-19 DIAGNOSIS — Z11.59 NEED FOR HEPATITIS C SCREENING TEST: ICD-10-CM

## 2023-07-19 LAB
EST. AVERAGE GLUCOSE BLD GHB EST-MCNC: 105 MG/DL
HBA1C MFR BLD: 5.3 % (ref 4–5.6)
HCV AB SER QL: NORMAL
TSH SERPL DL<=0.005 MIU/L-ACNC: 1.92 UIU/ML (ref 0.38–5.33)

## 2023-07-19 PROCEDURE — 86803 HEPATITIS C AB TEST: CPT

## 2023-07-19 PROCEDURE — 84443 ASSAY THYROID STIM HORMONE: CPT

## 2023-07-19 PROCEDURE — 36415 COLL VENOUS BLD VENIPUNCTURE: CPT

## 2023-07-19 PROCEDURE — 83036 HEMOGLOBIN GLYCOSYLATED A1C: CPT

## 2023-07-24 ENCOUNTER — OFFICE VISIT (OUTPATIENT)
Dept: MEDICAL GROUP | Facility: PHYSICIAN GROUP | Age: 33
End: 2023-07-24
Payer: COMMERCIAL

## 2023-07-24 VITALS
TEMPERATURE: 96.8 F | OXYGEN SATURATION: 99 % | BODY MASS INDEX: 40.4 KG/M2 | HEART RATE: 78 BPM | HEIGHT: 63 IN | DIASTOLIC BLOOD PRESSURE: 80 MMHG | SYSTOLIC BLOOD PRESSURE: 122 MMHG | WEIGHT: 228 LBS

## 2023-07-24 DIAGNOSIS — F41.1 GAD (GENERALIZED ANXIETY DISORDER): ICD-10-CM

## 2023-07-24 DIAGNOSIS — L98.9 SKIN LESION OF LEFT LOWER EXTREMITY: ICD-10-CM

## 2023-07-24 DIAGNOSIS — K58.9 IRRITABLE BOWEL SYNDROME, UNSPECIFIED TYPE: ICD-10-CM

## 2023-07-24 PROCEDURE — 3079F DIAST BP 80-89 MM HG: CPT | Performed by: NURSE PRACTITIONER

## 2023-07-24 PROCEDURE — 3074F SYST BP LT 130 MM HG: CPT | Performed by: NURSE PRACTITIONER

## 2023-07-24 PROCEDURE — 99214 OFFICE O/P EST MOD 30 MIN: CPT | Performed by: NURSE PRACTITIONER

## 2023-07-24 ASSESSMENT — FIBROSIS 4 INDEX: FIB4 SCORE: 0.56

## 2023-07-24 NOTE — PROGRESS NOTES
Subjective:     CC: nevus    HPI:   Earlene presents today with the following:    RADHA (generalized anxiety disorder)  She tried fluoxetine but it caused stomach ache and diarrhea. She took the medication for 4-5 days. She has not taken the medication in the last month. She has quit smoking marijuana that has helped. Her anxiety has improved but is not debilitating as it was previously.     IBS (irritable bowel syndrome)  She as decreased her marijuana use and this has helped her anxiety and stomach ache. She has GI appointment 2023 with [facility]. Continues omeprazole 40 mg twice daily and dicyclomine 20 mg 4 times daily as needed. She has needed dicyclomine once and it was after she ate fried food. She is doing a food diary and has cut out fried foods and fatty foods. Eating rice, veggies, ground turkey.     Skin lesion of left lower extremity  1 year ago she noticed a lesion to her left leg. The lesion is getting bigger in size. It does not cause pain. The lesion can itch at times. The lesion can change color and become inflamed.       Past Medical History:   Diagnosis Date    Depression     IBS (irritable bowel syndrome)     Migraine     Severe cervical dysplasia, histologically confirmed     hpv       Social History     Tobacco Use    Smoking status: Former     Years: 10.00     Types: Cigarettes     Quit date:      Years since quittin.5    Smokeless tobacco: Former    Tobacco comments:     on and off for ten years    Vaping Use    Vaping Use: Former    Substances: THC   Substance Use Topics    Alcohol use: Not Currently     Alcohol/week: 1.2 oz     Types: 2 Standard drinks or equivalent per week    Drug use: Not Currently     Frequency: 7.0 times per week     Types: Marijuana, Inhaled     Comment: marijuana       Current Outpatient Medications Ordered in Epic   Medication Sig Dispense Refill    omeprazole (PRILOSEC) 40 MG delayed-release capsule Take 1 Capsule by mouth 2 times a day. 180 Capsule 0     "ondansetron (ZOFRAN ODT) 8 MG TABLET DISPERSIBLE Take 1 Tablet by mouth every 12 hours as needed for Nausea. 15 Tablet 0    promethazine (PHENERGAN) 25 MG Tab Take 1 Tablet by mouth every 6 hours as needed for Nausea/Vomiting. 15 Tablet 0    dicyclomine (BENTYL) 20 MG Tab Take 1 Tablet by mouth 4 times a day as needed (abdominal cramps). 20 Tablet 0    ibuprofen (MOTRIN) 200 MG Tab Take 800 mg by mouth every 6 hours as needed. Indications: Pain      promethazine (PHENERGAN) 25 MG Suppos Insert 1 Suppository into the rectum every 6 hours as needed for Nausea/Vomiting. (Patient not taking: Reported on 7/24/2023) 5 Suppository 0     No current [facility]-ordered facility-administered medications on file.       Allergies:  Nkda [no known drug allergy]    Health Maintenance: Reviewed      Objective:     Vital signs reviewed  Exam:  /80 (BP Location: Right arm, Patient Position: Sitting, BP Cuff Size: Adult)   Pulse 78   Temp 36 °C (96.8 °F) (Temporal)   Ht 1.6 m (5' 3\")   Wt 103 kg (228 lb)   SpO2 99%   BMI 40.39 kg/m²  Body mass index is 40.39 kg/m².    Gen: Alert and oriented, No apparent distress.  Neck: Neck is supple, full ROM.  Lungs: Normal effort, no audible wheezes  CV: Skin pink, warm and dry.  Ext: No clubbing, cyanosis, edema.  Skin:    left upper tight with 5 mm in diameter, flesh colored, dry raised papule with regular border    Assessment & Plan:     33 y.o. female with the following -     1. RADHA (generalized anxiety disorder)  Chronic stable problem.  Continue to work on decreasing marijuana use.  Continue with healthy diet and exercise.    2. Irritable bowel syndrome, unspecified type  Chronic stable problem.  Continue with omeprazole 40 mg twice daily and dicyclomine 20 mg 4 times daily as needed.  Continue avoiding fried foods and fatty foods.  Continue to identify diet triggers and avoid.  Keep upcoming GI appointment.    3. Skin lesion of left lower extremity  Chronic exacerbated problem.  " Referral to dermatology.  - Referral to Dermatology        Return in about 2 months (around 9/24/2023) for IBS, anxiety .    Please note that this dictation was created using voice recognition software. I have made every reasonable attempt to correct obvious errors, but I expect that there are errors of grammar and possibly content that I did not discover before finalizing the note.

## 2023-07-24 NOTE — ASSESSMENT & PLAN NOTE
She as decreased her marijuana use and this has helped her anxiety and stomach ache. She has GI appointment 8/31/2023 with [facility]. Continues omeprazole 40 mg twice daily and dicyclomine 20 mg 4 times daily as needed. She has needed dicyclomine once and it was after she ate fried food. She is doing a food diary and has cut out fried foods and fatty foods. Eating rice, veggies, ground turkey.

## 2023-07-24 NOTE — ASSESSMENT & PLAN NOTE
She tried fluoxetine but it caused stomach ache and diarrhea. She took the medication for 4-5 days. She has not taken the medication in the last month. She has quit smoking marijuana that has helped. Her anxiety has improved but is not debilitating as it was previously.

## 2023-07-24 NOTE — ASSESSMENT & PLAN NOTE
1 year ago she noticed a lesion to her left leg. The lesion is getting bigger in size. It does not cause pain. The lesion can itch at times. The lesion can change color and become inflamed.

## 2023-09-05 ENCOUNTER — OFFICE VISIT (OUTPATIENT)
Dept: DERMATOLOGY | Facility: IMAGING CENTER | Age: 33
End: 2023-09-05
Payer: COMMERCIAL

## 2023-09-05 DIAGNOSIS — L91.8 ACROCHORDON: ICD-10-CM

## 2023-09-05 DIAGNOSIS — B07.9 VERRUCAS: ICD-10-CM

## 2023-09-05 PROCEDURE — 17110 DESTRUCTION B9 LES UP TO 14: CPT | Performed by: NURSE PRACTITIONER

## 2023-09-05 PROCEDURE — 99213 OFFICE O/P EST LOW 20 MIN: CPT | Mod: 25 | Performed by: NURSE PRACTITIONER

## 2023-09-05 NOTE — PROGRESS NOTES
DERMATOLOGY NOTE  NEW VISIT       Chief complaint: Establish Care and Skin Lesion     HPI/location: abd lesion   Time present: + 1 yr  Painful lesion: No  Itching lesion: Yes  Enlarging lesion: No  Anything make it better or worse?    HPI/location: lt thigh   Time present: +1 yr   Painful lesion: No  Itching lesion: Yes  Enlarging lesion: No  Anything make it better or worse?      HPI/location: lt hand   Time present: +1 yr  Painful lesion: No  Itching lesion: Yes  Enlarging lesion: Yes  Anything make it better or worse?      History of skin cancer: No  History of precancers/actinic keratoses: No  History of biopsies:No  History of blistering/severe sunburns:Yes, Details: multiple   Family history of skin cancer:Yes, Details: paternal GP, melanoma, mom unknown   Family history of atypical moles:No      Allergies   Allergen Reactions    Nkda [No Known Drug Allergy]         MEDICATIONS:  Medications relevant to specialty reviewed.     REVIEW OF SYSTEMS:   Positive for skin (see HPI)  Negative for fevers and chills       EXAM:  There were no vitals taken for this visit.  Constitutional: Well-developed, well-nourished, and in no distress.     A focused skin exam was performed including the affected areas of the L hand and thigh, abdomen. Notable findings on exam today listed below and/or in assessment/plan.     Tiny verruca to dorsum of L hand and L anterior thigh  1-2mm skin-colored to hyperpigmented, soft, pedunculated papule on mid upper abdomen    IMPRESSION / PLAN:    1. Verrucas  - Benign-appearing nature of lesions discussed during exam.   CRYOTHERAPY:  Risks (including, but not limited to: skin discoloration, redness, blister, blood blister, recurrence, need for further treatment, infection, scar) and benefits of cryotherapy discussed. Patient verbally agreed to proceed with treatment. 2 cryotherapy freeze thaw cycles of 10 seconds were applied to 2 lesions on L hand and thigh with cryac. Patient tolerated  procedure well. Aftercare instructions given--no specific care needed unless irritated during healing process, can apply Vaseline with small band-aid if needed.  - Advised to continue to monitor for any return to clinic for new or concerning changes.      2. Acrochordon  - Benign-appearing nature of lesions discussed during exam.   - Advised to continue to monitor for any return to clinic for new or concerning changes.        Discussed risks associated with LN2, Patient verbalized understanding and agrees with plan regarding the above              Please note that this dictation was created using voice recognition software. I have made every reasonable attempt to correct obvious errors, but I expect that there are errors of grammar and possibly content that I did not discover before finalizing the note.      Return to clinic in: Return for PRN. and as needed for any new or changing skin lesions.

## 2023-10-17 ENCOUNTER — APPOINTMENT (OUTPATIENT)
Dept: MEDICAL GROUP | Facility: PHYSICIAN GROUP | Age: 33
End: 2023-10-17
Payer: COMMERCIAL

## 2023-11-16 ENCOUNTER — APPOINTMENT (OUTPATIENT)
Dept: MEDICAL GROUP | Facility: PHYSICIAN GROUP | Age: 33
End: 2023-11-16
Payer: COMMERCIAL

## 2024-01-25 ENCOUNTER — OFFICE VISIT (OUTPATIENT)
Dept: MEDICAL GROUP | Facility: PHYSICIAN GROUP | Age: 34
End: 2024-01-25
Payer: COMMERCIAL

## 2024-01-25 VITALS
TEMPERATURE: 96.5 F | HEART RATE: 98 BPM | HEIGHT: 63 IN | BODY MASS INDEX: 39.69 KG/M2 | DIASTOLIC BLOOD PRESSURE: 92 MMHG | SYSTOLIC BLOOD PRESSURE: 132 MMHG | OXYGEN SATURATION: 97 % | WEIGHT: 224 LBS

## 2024-01-25 DIAGNOSIS — R03.0 ELEVATED BLOOD PRESSURE READING IN OFFICE WITHOUT DIAGNOSIS OF HYPERTENSION: ICD-10-CM

## 2024-01-25 DIAGNOSIS — F32.1 CURRENT MODERATE EPISODE OF MAJOR DEPRESSIVE DISORDER WITHOUT PRIOR EPISODE (HCC): ICD-10-CM

## 2024-01-25 PROCEDURE — 3080F DIAST BP >= 90 MM HG: CPT | Performed by: NURSE PRACTITIONER

## 2024-01-25 PROCEDURE — 3075F SYST BP GE 130 - 139MM HG: CPT | Performed by: NURSE PRACTITIONER

## 2024-01-25 PROCEDURE — 99213 OFFICE O/P EST LOW 20 MIN: CPT | Performed by: NURSE PRACTITIONER

## 2024-01-25 RX ORDER — LISDEXAMFETAMINE DIMESYLATE 30 MG/1
CAPSULE ORAL
COMMUNITY
Start: 2024-01-05

## 2024-01-25 RX ORDER — RAMELTEON 8 MG/1
8 TABLET ORAL NIGHTLY
COMMUNITY
Start: 2024-01-23

## 2024-01-25 ASSESSMENT — PATIENT HEALTH QUESTIONNAIRE - PHQ9
CLINICAL INTERPRETATION OF PHQ2 SCORE: 2
5. POOR APPETITE OR OVEREATING: 1 - SEVERAL DAYS
SUM OF ALL RESPONSES TO PHQ QUESTIONS 1-9: 10

## 2024-01-25 ASSESSMENT — FIBROSIS 4 INDEX: FIB4 SCORE: 0.56

## 2024-01-26 NOTE — ASSESSMENT & PLAN NOTE
Followed by psychiatry. PHQ score 10.  Has prescription for sertraline but is waiting to take until she gets her ADHD medication situated.  Denies any self-harm or SI today.

## 2024-01-26 NOTE — PROGRESS NOTES
Subjective:     CC: Medication reaction     HPI:   Earlene presents today with the following:    Elevated blood pressure reading in office without diagnosis of hypertension  Blood pressure today 132/92.  She was started on Vyvanse 30 mg daily on 1/5/2024 by her psychiatrist Yuly Ernst with [facility]. Since starting Vyvanse she went back for 2 week check up on 1/19/2024 and BP was >140/90 and end of appointment 151/98. She stopped the medication x 2 days, checked her blood pressure at home >140/90. 4 days ago blood pressure was 122/80 without Vyvanse. Other reading without medication was 117/73. She would prefer to not stop the Vyvanse.  The Vyvanse is helping her ADHD symptoms.  She took the dose today at 6:00 a.m. Her home monitoring is a wrist cuff. Has appointment next week 2/2/2024 with psychiatry.  No chest pain, shortness of breath or palpitations.    Current moderate episode of major depressive disorder without prior episode (HCC)  Followed by psychiatry. PHQ score 10.  Has prescription for sertraline but is waiting to take until she gets her ADHD medication situated.  Denies any self-harm or SI today.    Past Medical History:   Diagnosis Date    Depression     IBS (irritable bowel syndrome)     Migraine     Severe cervical dysplasia, histologically confirmed 2017    hpv       Social History     Tobacco Use    Smoking status: Former     Current packs/day: 0.00     Types: Cigarettes     Start date: 2004     Quit date: 2014     Years since quitting: 10.0    Smokeless tobacco: Former    Tobacco comments:     on and off for ten years    Vaping Use    Vaping Use: Former    Substances: THC   Substance Use Topics    Alcohol use: Not Currently     Alcohol/week: 1.2 oz     Types: 2 Standard drinks or equivalent per week    Drug use: Not Currently     Frequency: 7.0 times per week     Types: Marijuana, Inhaled     Comment: marijuana       Current Outpatient Medications Ordered in  "[facility]   Medication Sig Dispense Refill    VYVANSE 30 MG capsule       ramelteon (ROZEREM) 8 MG tablet Take 8 mg by mouth every evening.      ibuprofen (MOTRIN) 200 MG Tab Take 800 mg by mouth every 6 hours as needed. Indications: Pain       No current Epic-ordered facility-administered medications on file.       Allergies:  Nkda [no known drug allergy]    Health Maintenance: Reviewed      Objective:     Vital signs reviewed  Exam:  BP (!) 132/92 (BP Location: Left arm, Patient Position: Sitting, BP Cuff Size: Adult)   Pulse 98   Temp 35.8 °C (96.5 °F) (Temporal)   Ht 1.6 m (5' 3\")   Wt 102 kg (224 lb)   SpO2 97%   BMI 39.68 kg/m²  Body mass index is 39.68 kg/m².    Gen: Alert and oriented, No apparent distress.  Lungs: Normal effort, CTA bilaterally, no wheezes, rhonchi, or rales  CV: Regular rate and rhythm. No murmurs, rubs, or gallops.  Ext: No clubbing, cyanosis, edema.      Assessment & Plan:     33 y.o. female with the following -     1. Elevated blood pressure reading in office without diagnosis of hypertension  Acute uncomplicated problem.  Chart review shows that previous blood pressures have been 120s/80s.  Suspect her elevated blood pressures due to the Vyvanse medication.  Discussion for patient to watch salt in her diet as she currently follows a keto diet.  Discussion to return next week for MA BP check and will have her bring in her wrist cuff to correlate.  She does see her psychiatrist next Friday and we discussed bringing in her blood pressure readings and seeing if her psychiatrist will either lower the dose or try alternative medication.    2. Current moderate episode of major depressive disorder without prior episode (HCC)  Chronic stable problem.  She has follow-up next week with her psychiatrist.  Recommend she start sertraline when she is ready.  - Patient has been identified as having a positive depression screening. Appropriate orders and counseling have been given.          Return in " about 1 week (around 2/1/2024) for MA visit for BP check.    Please note that this dictation was created using voice recognition software. I have made every reasonable attempt to correct obvious errors, but I expect that there are errors of grammar and possibly content that I did not discover before finalizing the note.

## 2024-01-26 NOTE — ASSESSMENT & PLAN NOTE
Blood pressure today 132/92.  She was started on Vyvanse 30 mg daily on 1/5/2024 by her psychiatrist Yuly Ernst with [facility]. Since starting Vyvanse she went back for 2 week check up on 1/19/2024 and BP was >140/90 and end of appointment 151/98. She stopped the medication x 2 days, checked her blood pressure at home >140/90. 4 days ago blood pressure was 122/80 without Vyvanse. Other reading without medication was 117/73. She would prefer to not stop the Vyvanse.  The Vyvanse is helping her ADHD symptoms.  She took the dose today at 6:00 a.m. Her home monitoring is a wrist cuff. Has appointment next week 2/2/2024 with psychiatry.  No chest pain, shortness of breath or palpitations.

## 2024-01-31 ENCOUNTER — NON-PROVIDER VISIT (OUTPATIENT)
Dept: MEDICAL GROUP | Facility: PHYSICIAN GROUP | Age: 34
End: 2024-01-31
Payer: COMMERCIAL

## 2025-01-29 ENCOUNTER — OFFICE VISIT (OUTPATIENT)
Dept: MEDICAL GROUP | Facility: PHYSICIAN GROUP | Age: 35
End: 2025-01-29
Payer: COMMERCIAL

## 2025-01-29 VITALS
DIASTOLIC BLOOD PRESSURE: 80 MMHG | BODY MASS INDEX: 40.4 KG/M2 | SYSTOLIC BLOOD PRESSURE: 118 MMHG | OXYGEN SATURATION: 99 % | TEMPERATURE: 97.9 F | HEIGHT: 63 IN | HEART RATE: 89 BPM | WEIGHT: 228 LBS

## 2025-01-29 DIAGNOSIS — Z00.00 HEALTHCARE MAINTENANCE: ICD-10-CM

## 2025-01-29 DIAGNOSIS — F90.0 ATTENTION DEFICIT HYPERACTIVITY DISORDER (ADHD), PREDOMINANTLY INATTENTIVE TYPE: ICD-10-CM

## 2025-01-29 DIAGNOSIS — Z13.228 SCREENING FOR ENDOCRINE, METABOLIC AND IMMUNITY DISORDER: ICD-10-CM

## 2025-01-29 DIAGNOSIS — Z87.42 HISTORY OF PCOS: ICD-10-CM

## 2025-01-29 DIAGNOSIS — Z13.29 SCREENING FOR ENDOCRINE, METABOLIC AND IMMUNITY DISORDER: ICD-10-CM

## 2025-01-29 DIAGNOSIS — Z00.00 PREVENTATIVE HEALTH CARE: ICD-10-CM

## 2025-01-29 DIAGNOSIS — E66.01 MORBID OBESITY WITH BMI OF 40.0-44.9, ADULT (HCC): ICD-10-CM

## 2025-01-29 DIAGNOSIS — Z79.899 MEDICATION MANAGEMENT: ICD-10-CM

## 2025-01-29 DIAGNOSIS — Z13.0 SCREENING FOR ENDOCRINE, METABOLIC AND IMMUNITY DISORDER: ICD-10-CM

## 2025-01-29 PROBLEM — L98.9 SKIN LESION OF LEFT LOWER EXTREMITY: Status: RESOLVED | Noted: 2023-07-24 | Resolved: 2025-01-29

## 2025-01-29 PROBLEM — E66.9 OBESITY (BMI 30-39.9): Status: RESOLVED | Noted: 2021-12-20 | Resolved: 2025-01-29

## 2025-01-29 PROCEDURE — 3079F DIAST BP 80-89 MM HG: CPT | Performed by: NURSE PRACTITIONER

## 2025-01-29 PROCEDURE — 99214 OFFICE O/P EST MOD 30 MIN: CPT | Mod: 25 | Performed by: NURSE PRACTITIONER

## 2025-01-29 PROCEDURE — 3074F SYST BP LT 130 MM HG: CPT | Performed by: NURSE PRACTITIONER

## 2025-01-29 RX ORDER — DOXYCYCLINE 100 MG/1
100 TABLET ORAL EVERY 12 HOURS
COMMUNITY
Start: 2025-01-27

## 2025-01-29 RX ORDER — LISDEXAMFETAMINE DIMESYLATE 40 MG/1
CAPSULE ORAL
COMMUNITY
Start: 2025-01-27

## 2025-01-29 ASSESSMENT — PATIENT HEALTH QUESTIONNAIRE - PHQ9
1. LITTLE INTEREST OR PLEASURE IN DOING THINGS: NOT AT ALL
SUM OF ALL RESPONSES TO PHQ9 QUESTIONS 1 AND 2: 0
2. FEELING DOWN, DEPRESSED, IRRITABLE, OR HOPELESS: NOT AT ALL
3. TROUBLE FALLING OR STAYING ASLEEP OR SLEEPING TOO MUCH: NOT AT ALL
8. MOVING OR SPEAKING SO SLOWLY THAT OTHER PEOPLE COULD HAVE NOTICED. OR THE OPPOSITE, BEING SO FIGETY OR RESTLESS THAT YOU HAVE BEEN MOVING AROUND A LOT MORE THAN USUAL: NOT AT ALL
9. THOUGHTS THAT YOU WOULD BE BETTER OFF DEAD, OR OF HURTING YOURSELF: NOT AT ALL
6. FEELING BAD ABOUT YOURSELF - OR THAT YOU ARE A FAILURE OR HAVE LET YOURSELF OR YOUR FAMILY DOWN: NOT AL ALL
4. FEELING TIRED OR HAVING LITTLE ENERGY: NOT AT ALL
5. POOR APPETITE OR OVEREATING: NOT AT ALL
SUM OF ALL RESPONSES TO PHQ QUESTIONS 1-9: 0
7. TROUBLE CONCENTRATING ON THINGS, SUCH AS READING THE NEWSPAPER OR WATCHING TELEVISION: NOT AT ALL

## 2025-01-29 ASSESSMENT — FIBROSIS 4 INDEX: FIB4 SCORE: .5757575757575757576

## 2025-01-29 NOTE — LETTER
[facility]  NOEMI Chavarria.  [address] [place] [address]  Fax: [phone]   Authorization for Release/Disclosure of   Protected Health Information   Name: SHIRLENE HI : 1990 SSN: [id number]   Address:  [address] [place] [address] Phone:    There are no phone numbers on file.   I authorize the entity listed below to release/disclose the PHI below to:   [facility]/CALLY Chavarria and CALLY Chavarria   Provider or Entity Name:  Domingo Khan MD    Address   City, [facility], Zip  [address] [place] [address]  Phone:      Fax:     Reason for request: continuity of care   Information to be released:    [  ] LAST COLONOSCOPY,  including any PATH REPORT and follow-up  [  ] LAST FIT/COLOGUARD RESULT [  ] LAST DEXA  [  ] LAST MAMMOGRAM  [  ] LAST PAP  [  ] LAST LABS [  ] RETINA EXAM REPORT  [  ] IMMUNIZATION RECORDS  [xx] Release all info      [  ] Check here and initial the line next to each item to release ALL health information INCLUDING  _____ Care and treatment for drug and / or alcohol abuse  _____ HIV testing, infection status, or AIDS  _____ Genetic Testing    DATES OF SERVICE OR TIME PERIOD TO BE DISCLOSED: _____________  I understand and acknowledge that:  * This Authorization may be revoked at any time by you in writing, except if your health information has already been used or disclosed.  * Your health information that will be used or disclosed as a result of you signing this authorization could be re-disclosed by the recipient. If this occurs, your re-disclosed health information may no longer be protected by State or Federal laws.  * You may refuse to sign this Authorization. Your refusal will not affect your ability to obtain treatment.  * This Authorization becomes effective upon signing and will  on (date) __________.      If no date is indicated, this Authorization will  one (1) year from the signature date.     Name: Earlene Brown Kasey  Signature: Date:   1/29/2025     PLEASE FAX REQUESTED RECORDS BACK TO: [phone]

## 2025-01-30 NOTE — ASSESSMENT & PLAN NOTE
She had biometric screening at work 11/5/2024. She is able to bring up her results on her phone today. Labs: Glucose 77 (she was on a 48 hour fast at the time), A1C 5.6%, total cholesterol 109, HDL 60, non-HDL 49. Family history diabetes father and mother.

## 2025-01-30 NOTE — ASSESSMENT & PLAN NOTE
Followed by psychiatrist Yuly Ernst with [facility]. Currently taking Vyvanse 40 mg daily. She started medication last year. Psychiatrist requesting EKG to continue with Vyvanse prescription. BP today 118/80. No chest pain, shortness of breath or palpations. She did try 50 mg Vyvanse dose but had intermittent chest pain so dose was decreased to 40 mg daily.

## 2025-01-30 NOTE — ASSESSMENT & PLAN NOTE
Current gynecologist Dr. Domingo Khan.  Reports that her gynecologist diagnosed her with PCOS 10+ years ago. She has been on metformin in the past. She has been of metformin 5/2024. She does not remember the dose. History of ablation 10 years ago. Reports history of ovarian cysts. She has been going to gym and trying to lose weight. Requesting records.

## 2025-01-30 NOTE — PROGRESS NOTES
Subjective:     CC: medication management, new med request    HPI:   Earlene presents today with the following:    Attention deficit hyperactivity disorder (ADHD), predominantly inattentive type  Followed by psychiatrist Yuly Ernst with [facility]. Currently taking Vyvanse 40 mg daily. She started medication last year. Psychiatrist requesting EKG to continue with Vyvanse prescription. BP today 118/80. No chest pain, shortness of breath or palpations. She did try 50 mg Vyvanse dose but had intermittent chest pain so dose was decreased to 40 mg daily.    History of PCOS  Current gynecologist Dr. Domingo Khan.  Reports that her gynecologist diagnosed her with PCOS 10+ years ago. She has been on metformin in the past. She has been of metformin 2024. She does not remember the dose. History of ablation 10 years ago. Reports history of ovarian cysts. She has been going to gym and trying to lose weight. Requesting records.    Healthcare maintenance  She had biometric screening at work 2024. She is able to bring up her results on her phone today. Labs: Glucose 77 (she was on a 48 hour fast at the time), A1C 5.6%, total cholesterol 109, HDL 60, non-HDL 49. Family history diabetes father and mother.       Past Medical History:   Diagnosis Date    Depression     IBS (irritable bowel syndrome)     Migraine     Severe cervical dysplasia, histologically confirmed     hpv       Social History     Tobacco Use    Smoking status: Former     Current packs/day: 0.00     Types: Cigarettes     Start date:      Quit date:      Years since quittin.0    Smokeless tobacco: Former    Tobacco comments:     on and off for ten years    Vaping Use    Vaping status: Former    Substances: THC   Substance Use Topics    Alcohol use: Yes     Alcohol/week: 1.2 oz     Types: 2 Standard drinks or equivalent per week     Comment: social    Drug use: Not Currently     Frequency: 7.0 times per week     Types:  "Marijuana, Inhaled     Comment: marijuana       Current Outpatient Medications Ordered in Epic   Medication Sig Dispense Refill    VYVANSE 40 MG Cap qd      doxycycline monohydrate (ADOXA) 100 MG tablet Take 100 mg by mouth every 12 hours.      ramelteon (ROZEREM) 8 MG tablet Take 8 mg by mouth every evening.      ibuprofen (MOTRIN) 200 MG Tab Take 800 mg by mouth every 6 hours as needed. Indications: Pain       No current Epic-ordered facility-administered medications on file.       Allergies:  Nkda [no known drug allergy]    Health Maintenance: Reviewed       Objective:     Vital signs reviewed  Exam:  /80 (BP Location: Left arm, Patient Position: Sitting, BP Cuff Size: Adult)   Pulse 89   Temp 36.6 °C (97.9 °F) (Temporal)   Ht 1.6 m (5' 3\")   Wt 103 kg (228 lb)   SpO2 99%   BMI 40.39 kg/m²  Body mass index is 40.39 kg/m².    Gen: Alert and oriented, No apparent distress.  Eyes:   Lids normal. Glasses in place.   Lungs: Normal effort, CTA bilaterally, no wheezes, rhonchi, or rales  CV: Regular rate and rhythm. No murmurs, rubs, or gallops.  Ext: No clubbing, cyanosis, edema.    EKG Interpretation-HR is 78 unchanged from previous tracings, normal sinus rhythm, no ST elevation or depression.       Assessment & Plan:     34 y.o. female with the following -     1. Attention deficit hyperactivity disorder (ADHD), predominantly inattentive type  Chronic stable problem.  EKG in office showed sinus rhythm without any ST elevation or depression.  Copy given to patient and will fax over copy to her psychiatrist's office here in [place] at [facility].  She can continue follow-up with her psychiatrist and continue with Vyvanse 40 mg daily.  - EKG    2. Medication management  Acute uncomplicated problem.  EKG in office today as patient is on Vyvanse.  - EKG    3. History of PCOS  Chronic stable problem.  Requesting records from her gynecologist.  She will see if she still has old prescription of " metformin.    4. Healthcare maintenance  Acute uncomplicated problem.  Discussed checking updated labs and she is in agreement.    5. Morbid obesity with BMI of 40.0-44.9, adult (HCC)  Chronic exacerbated problem.  Updated labs ordered.  Declines dietitian referral at this time.  - CBC WITH DIFFERENTIAL; Future  - Comp Metabolic Panel; Future  - HEMOGLOBIN A1C; Future  - Lipid Profile; Future  - TSH WITH REFLEX TO FT4; Future  - VITAMIN D,25 HYDROXY (DEFICIENCY); Future  - Patient identified as having weight management issue.  Appropriate orders and counseling given.    6. Preventative health care  Acute uncomplicated problem.  Annual labs ordered.  - CBC WITH DIFFERENTIAL; Future  - Comp Metabolic Panel; Future  - Lipid Profile; Future    7. Screening for endocrine, metabolic and immunity disorder  Acute complicated problem.  Screening labs ordered.  - TSH WITH REFLEX TO FT4; Future  - VITAMIN D,25 HYDROXY (DEFICIENCY); Future      Return in about 4 weeks (around 2/26/2025) for Labs.    Please note that this dictation was created using voice recognition software. I have made every reasonable attempt to correct obvious errors, but I expect that there are errors of grammar and possibly content that I did not discover before finalizing the note.

## 2025-02-03 ENCOUNTER — HOSPITAL ENCOUNTER (OUTPATIENT)
Dept: LAB | Facility: MEDICAL CENTER | Age: 35
End: 2025-02-03
Attending: NURSE PRACTITIONER
Payer: COMMERCIAL

## 2025-02-03 DIAGNOSIS — E66.01 MORBID OBESITY WITH BMI OF 40.0-44.9, ADULT (HCC): ICD-10-CM

## 2025-02-03 DIAGNOSIS — Z13.228 SCREENING FOR ENDOCRINE, METABOLIC AND IMMUNITY DISORDER: ICD-10-CM

## 2025-02-03 DIAGNOSIS — Z13.0 SCREENING FOR ENDOCRINE, METABOLIC AND IMMUNITY DISORDER: ICD-10-CM

## 2025-02-03 DIAGNOSIS — Z13.29 SCREENING FOR ENDOCRINE, METABOLIC AND IMMUNITY DISORDER: ICD-10-CM

## 2025-02-03 DIAGNOSIS — Z00.00 PREVENTATIVE HEALTH CARE: ICD-10-CM

## 2025-02-03 LAB
25(OH)D3 SERPL-MCNC: 27 NG/ML (ref 30–100)
ALBUMIN SERPL BCP-MCNC: 4.2 G/DL (ref 3.2–4.9)
ALBUMIN/GLOB SERPL: 1.7 G/DL
ALP SERPL-CCNC: 64 U/L (ref 30–99)
ALT SERPL-CCNC: 17 U/L (ref 2–50)
ANION GAP SERPL CALC-SCNC: 8 MMOL/L (ref 7–16)
AST SERPL-CCNC: 18 U/L (ref 12–45)
BASOPHILS # BLD AUTO: 0.8 % (ref 0–1.8)
BASOPHILS # BLD: 0.07 K/UL (ref 0–0.12)
BILIRUB SERPL-MCNC: 0.3 MG/DL (ref 0.1–1.5)
BUN SERPL-MCNC: 14 MG/DL (ref 8–22)
CALCIUM ALBUM COR SERPL-MCNC: 9 MG/DL (ref 8.5–10.5)
CALCIUM SERPL-MCNC: 9.2 MG/DL (ref 8.5–10.5)
CHLORIDE SERPL-SCNC: 104 MMOL/L (ref 96–112)
CHOLEST SERPL-MCNC: 181 MG/DL (ref 100–199)
CO2 SERPL-SCNC: 25 MMOL/L (ref 20–33)
CREAT SERPL-MCNC: 0.9 MG/DL (ref 0.5–1.4)
EOSINOPHIL # BLD AUTO: 0.33 K/UL (ref 0–0.51)
EOSINOPHIL NFR BLD: 3.7 % (ref 0–6.9)
ERYTHROCYTE [DISTWIDTH] IN BLOOD BY AUTOMATED COUNT: 47.2 FL (ref 35.9–50)
EST. AVERAGE GLUCOSE BLD GHB EST-MCNC: 88 MG/DL
FASTING STATUS PATIENT QL REPORTED: NORMAL
GFR SERPLBLD CREATININE-BSD FMLA CKD-EPI: 85 ML/MIN/1.73 M 2
GLOBULIN SER CALC-MCNC: 2.5 G/DL (ref 1.9–3.5)
GLUCOSE SERPL-MCNC: 85 MG/DL (ref 65–99)
HBA1C MFR BLD: 4.7 % (ref 4–5.6)
HCT VFR BLD AUTO: 42.5 % (ref 37–47)
HDLC SERPL-MCNC: 46 MG/DL
HGB BLD-MCNC: 13.7 G/DL (ref 12–16)
IMM GRANULOCYTES # BLD AUTO: 0.01 K/UL (ref 0–0.11)
IMM GRANULOCYTES NFR BLD AUTO: 0.1 % (ref 0–0.9)
LDLC SERPL CALC-MCNC: 109 MG/DL
LYMPHOCYTES # BLD AUTO: 3.04 K/UL (ref 1–4.8)
LYMPHOCYTES NFR BLD: 33.7 % (ref 22–41)
MCH RBC QN AUTO: 30.4 PG (ref 27–33)
MCHC RBC AUTO-ENTMCNC: 32.2 G/DL (ref 32.2–35.5)
MCV RBC AUTO: 94.2 FL (ref 81.4–97.8)
MONOCYTES # BLD AUTO: 0.47 K/UL (ref 0–0.85)
MONOCYTES NFR BLD AUTO: 5.2 % (ref 0–13.4)
NEUTROPHILS # BLD AUTO: 5.09 K/UL (ref 1.82–7.42)
NEUTROPHILS NFR BLD: 56.5 % (ref 44–72)
NRBC # BLD AUTO: 0 K/UL
NRBC BLD-RTO: 0 /100 WBC (ref 0–0.2)
PLATELET # BLD AUTO: 319 K/UL (ref 164–446)
PMV BLD AUTO: 9.6 FL (ref 9–12.9)
POTASSIUM SERPL-SCNC: 4.3 MMOL/L (ref 3.6–5.5)
PROT SERPL-MCNC: 6.7 G/DL (ref 6–8.2)
RBC # BLD AUTO: 4.51 M/UL (ref 4.2–5.4)
SODIUM SERPL-SCNC: 137 MMOL/L (ref 135–145)
TRIGL SERPL-MCNC: 132 MG/DL (ref 0–149)
TSH SERPL DL<=0.005 MIU/L-ACNC: 2.93 UIU/ML (ref 0.38–5.33)
WBC # BLD AUTO: 9 K/UL (ref 4.8–10.8)

## 2025-02-03 PROCEDURE — 84443 ASSAY THYROID STIM HORMONE: CPT

## 2025-02-03 PROCEDURE — 85025 COMPLETE CBC W/AUTO DIFF WBC: CPT

## 2025-02-03 PROCEDURE — 80061 LIPID PANEL: CPT

## 2025-02-03 PROCEDURE — 80053 COMPREHEN METABOLIC PANEL: CPT

## 2025-02-03 PROCEDURE — 82306 VITAMIN D 25 HYDROXY: CPT

## 2025-02-03 PROCEDURE — 36415 COLL VENOUS BLD VENIPUNCTURE: CPT

## 2025-02-03 PROCEDURE — 83036 HEMOGLOBIN GLYCOSYLATED A1C: CPT

## 2025-02-26 ENCOUNTER — OFFICE VISIT (OUTPATIENT)
Dept: MEDICAL GROUP | Facility: PHYSICIAN GROUP | Age: 35
End: 2025-02-26
Payer: COMMERCIAL

## 2025-02-26 VITALS
OXYGEN SATURATION: 99 % | DIASTOLIC BLOOD PRESSURE: 84 MMHG | SYSTOLIC BLOOD PRESSURE: 124 MMHG | WEIGHT: 229 LBS | HEART RATE: 95 BPM | HEIGHT: 63 IN | TEMPERATURE: 98.1 F | BODY MASS INDEX: 40.57 KG/M2

## 2025-02-26 DIAGNOSIS — E28.2 PCOS (POLYCYSTIC OVARIAN SYNDROME): ICD-10-CM

## 2025-02-26 DIAGNOSIS — E78.00 ELEVATED LDL CHOLESTEROL LEVEL: ICD-10-CM

## 2025-02-26 DIAGNOSIS — Z23 NEED FOR VACCINATION: ICD-10-CM

## 2025-02-26 DIAGNOSIS — Z71.2 ENCOUNTER TO DISCUSS TEST RESULTS: ICD-10-CM

## 2025-02-26 DIAGNOSIS — E55.9 VITAMIN D INSUFFICIENCY: ICD-10-CM

## 2025-02-26 ASSESSMENT — FIBROSIS 4 INDEX: FIB4 SCORE: 0.49

## 2025-02-26 NOTE — ASSESSMENT & PLAN NOTE
No current supplementation.  Recent vitamin D level 27.  Recommend she start over-the-counter vitamin D3 2000 units daily.

## 2025-02-26 NOTE — ASSESSMENT & PLAN NOTE
Recent labs show  which is improved from previous LDL of 131. She is following low carb high protein diet. Red meat 3 times a week. No fast food d/t gallbladder surgery as it causes loose stool. The ASCVD Risk score (Shari ROWLAND, et al., 2019) failed to calculate.

## 2025-02-26 NOTE — PROGRESS NOTES
Subjective:     CC: Lab results, new med request    HPI:   Earlene presents today with the following:    PCOS (polycystic ovarian syndrome)  We received records from her gynecologist.  Patient was previously taking metformin 500 mg twice daily.  She had recent labs completed that we reviewed.  She would like to restart metformin to help with insulin resistance.  She continues to exercise and eat a healthy diet.  Restarting metformin 500 mg twice daily.    Elevated LDL cholesterol level  Recent labs show  which is improved from previous LDL of 131. She is following low carb high protein diet. Red meat 3 times a week. No fast food d/t gallbladder surgery as it causes loose stool. The ASCVD Risk score (Hertel DK, et al., 2019) failed to calculate.      Vitamin D insufficiency  No current supplementation.  Recent vitamin D level 27.  Recommend she start over-the-counter vitamin D3 2000 units daily.        Past Medical History:   Diagnosis Date    Depression     IBS (irritable bowel syndrome)     Migraine     Severe cervical dysplasia, histologically confirmed     hpv       Social History     Tobacco Use    Smoking status: Former     Current packs/day: 0.00     Types: Cigarettes     Start date:      Quit date:      Years since quittin.1    Smokeless tobacco: Former    Tobacco comments:     on and off for ten years    Vaping Use    Vaping status: Former    Substances: THC   Substance Use Topics    Alcohol use: Yes     Alcohol/week: 1.2 oz     Types: 2 Standard drinks or equivalent per week     Comment: social    Drug use: Not Currently     Frequency: 7.0 times per week     Types: Marijuana, Inhaled     Comment: marijuana       Current Outpatient Medications Ordered in Epic   Medication Sig Dispense Refill    metFORMIN (GLUCOPHAGE) 500 MG Tab Take 1 Tablet by mouth 2 times a day. Start once daily for the first week. 180 Tablet 1    VYVANSE 40 MG Cap qd      ramelteon (ROZEREM) 8 MG tablet Take 8 mg by  "mouth every evening.      ibuprofen (MOTRIN) 200 MG Tab Take 800 mg by mouth every 6 hours as needed. Indications: Pain       No current Epic-ordered facility-administered medications on file.       Allergies:  Nkda [no known drug allergy]    Health Maintenance: Completing Tdap today      Objective:     Vital signs reviewed  Exam:  /84 (BP Location: Left arm, Patient Position: Sitting, BP Cuff Size: Adult)   Pulse 95   Temp 36.7 °C (98.1 °F) (Temporal)   Ht 1.6 m (5' 3\")   Wt 104 kg (229 lb)   SpO2 99%   BMI 40.57 kg/m²  Body mass index is 40.57 kg/m².    Gen: Alert and oriented, No apparent distress.  Eyes:   Lids normal. Glasses in place.   Lungs: Normal effort, no audible wheezes  CV: Skin pink, warm and dry.  Ext: No clubbing, cyanosis, edema.      Assessment & Plan:     34 y.o. female with the following -     1. PCOS (polycystic ovarian syndrome)  Chronic exacerbated problem.  Reviewed previous gynecology records.  She is in agreement to start medication.  Start metformin 500 mg twice daily.  For the first week she will take daily dose.  Follow-up in 3 months.  - metFORMIN (GLUCOPHAGE) 500 MG Tab; Take 1 Tablet by mouth 2 times a day. Start once daily for the first week.  Dispense: 180 Tablet; Refill: 1    2. Elevated LDL cholesterol level  Chronic exacerbated problem.  Continue healthy diet and exercise.  Recommend she decrease her red meat intake.    3. Vitamin D insufficiency  Chronic exacerbated problem.  Recommend over-the-counter vitamin D3 2000 units daily.    4. Encounter to discuss test results  Acute uncomplicated problem.  Discussed and reviewed lab results from 2/3/2025.    5. Need for vaccination  Acute uncomplicated problem.  She would like her Tdap today. I have placed the below orders and discussed them with an approved delegating provider. The MA is performing the below orders under the direction of Dr. Krishnamurthy.  - Tdap Vaccine =>8YO IM      Return in about 3 months (around " 5/26/2025) for medication management.    Please note that this dictation was created using voice recognition software. I have made every reasonable attempt to correct obvious errors, but I expect that there are errors of grammar and possibly content that I did not discover before finalizing the note.

## 2025-02-26 NOTE — ASSESSMENT & PLAN NOTE
We received records from her gynecologist.  Patient was previously taking metformin 500 mg twice daily.  She had recent labs completed that we reviewed.  She would like to restart metformin to help with insulin resistance.  She continues to exercise and eat a healthy diet.  Restarting metformin 500 mg twice daily.

## 2025-05-21 ENCOUNTER — OFFICE VISIT (OUTPATIENT)
Dept: MEDICAL GROUP | Facility: PHYSICIAN GROUP | Age: 35
End: 2025-05-21
Payer: COMMERCIAL

## 2025-05-21 VITALS
TEMPERATURE: 98.2 F | HEIGHT: 63 IN | BODY MASS INDEX: 42.07 KG/M2 | HEART RATE: 81 BPM | DIASTOLIC BLOOD PRESSURE: 84 MMHG | OXYGEN SATURATION: 99 % | WEIGHT: 237.44 LBS | SYSTOLIC BLOOD PRESSURE: 128 MMHG

## 2025-05-21 DIAGNOSIS — F90.0 ATTENTION DEFICIT HYPERACTIVITY DISORDER (ADHD), PREDOMINANTLY INATTENTIVE TYPE: ICD-10-CM

## 2025-05-21 DIAGNOSIS — R03.0 ELEVATED BLOOD PRESSURE READING IN OFFICE WITHOUT DIAGNOSIS OF HYPERTENSION: ICD-10-CM

## 2025-05-21 DIAGNOSIS — E78.00 ELEVATED LDL CHOLESTEROL LEVEL: ICD-10-CM

## 2025-05-21 DIAGNOSIS — E28.2 PCOS (POLYCYSTIC OVARIAN SYNDROME): ICD-10-CM

## 2025-05-21 DIAGNOSIS — E66.01 MORBID OBESITY WITH BMI OF 40.0-44.9, ADULT (HCC): Primary | ICD-10-CM

## 2025-05-21 PROCEDURE — 3074F SYST BP LT 130 MM HG: CPT | Performed by: NURSE PRACTITIONER

## 2025-05-21 PROCEDURE — 3079F DIAST BP 80-89 MM HG: CPT | Performed by: NURSE PRACTITIONER

## 2025-05-21 PROCEDURE — 99214 OFFICE O/P EST MOD 30 MIN: CPT | Performed by: NURSE PRACTITIONER

## 2025-05-21 ASSESSMENT — FIBROSIS 4 INDEX: FIB4 SCORE: 0.51

## 2025-05-21 NOTE — LETTER
May 21, 2025    To Whom It May Concern:         This is confirmation that Earleen Parks attended her scheduled appointment with CALLY Chavarria on 5/21/25. No concerns with EKG. Normal EKG.          If you have any questions please do not hesitate to call me at the phone number listed below.    Sincerely,          NOEMI Chavarria.  [phone]

## 2025-05-21 NOTE — PROGRESS NOTES
"Subjective:     CC: Medication managment    HPI:   Earlene presents today with the following:    Elevated blood pressure reading in office without diagnosis of hypertension  BP today 128/90. Repeat /84. No current BP mediction.   She is on Vyvanse that is prescribed by her psychiatrist. At psychiatrist office BP is checked with wrist cuff. Last office BP was normal.     PCOS (polycystic ovarian syndrome)  Here to follow-up on Metformin.  She is taking metformin 500 mg twice daily.  She has not noticed any changes in her weight.  Continue metformin 500 mg twice daily.    Attention deficit hyperactivity disorder (ADHD), predominantly inattentive type  Her psychiatrist is prescribing her Vyvanse and she can notice higher HR and higher BP when taking the medication. We completed EKG last visit. Psychiatrist requesting letter as EKG said \"abnormal\".     Morbid obesity with BMI of 40.0-44.9, adult (HCC)  BMI today 42.06 kg/m². She is eating high protein, low carb diet with intermittent fasting. She is going to the gym 2 times a week. She last seen dietician 10 years ago, agreeable to new referral.  She would like to start medication to help with weight loss.  Plan to start with Wegovy.       Past Medical History[1]    Social History[2]    Current Medications and Prescriptions Ordered in Epic[3]    Allergies:  Nkda [no known drug allergy]    Health Maintenance: Reviewed       Objective:     Vital signs reviewed  Exam:  /84 (BP Location: Right arm, Patient Position: Sitting)   Pulse 81   Temp 36.8 °C (98.2 °F) (Temporal)   Ht 1.6 m (5' 3\")   Wt 108 kg (237 lb 7 oz)   SpO2 99%   BMI 42.06 kg/m²  Body mass index is 42.06 kg/m².    Gen: Alert and oriented, No apparent distress.  Eyes:   Lids normal. Glasses in place.   Lungs: Normal effort, CTA bilaterally, no wheezes, rhonchi, or rales  CV: Regular rate and rhythm. No murmurs, rubs, or gallops.      Assessment & Plan:     35 y.o. female with the following - "     1. Morbid obesity with BMI of 40.0-44.9, adult (HCC) (Primary)  Chronic exacerbated problem.  Referral to dietitian.  Start Wegovy 0.25 mg every 7 days.  Follow-up in 1 month. No family history of thyroid cancer and no personal history.   - Referral to Nutrition Services  - Semaglutide (WEGOVY) 0.25 MG/0.5ML Solution Auto-injector Pen-injector; Inject 0.5 mL under the skin every 7 days.  Dispense: 2 mL; Refill: 0    2. Elevated LDL cholesterol level  Chronic exacerbated problem.  Referral to dietitian.  - Referral to Nutrition Services    3. PCOS (polycystic ovarian syndrome)  Chronic stable problem.  She has been tolerating metformin.  Continue metformin 500 mg twice daily.    4. Attention deficit hyperactivity disorder (ADHD), predominantly inattentive type  Chronic stable problem.  Continue follow-up with psychiatry.  Letter provided today.    5. Elevated blood pressure reading in office without diagnosis of hypertension  Chronic stable problem.  BP controlled today 128/84.  Continue healthy diet and exercise.    Return in about 6 weeks (around 2025) for medication management.    Educated in proper administration of medication(s) ordered today including safety, possible SE, risks, benefits, rationale and alternatives to therapy.     Please note that this dictation was created using voice recognition software. I have made every reasonable attempt to correct obvious errors, but I expect that there are errors of grammar and possibly content that I did not discover before finalizing the note.               [1]   Past Medical History:  Diagnosis Date    Depression     IBS (irritable bowel syndrome)     Migraine     Severe cervical dysplasia, histologically confirmed 2017    hpv   [2]   Social History  Tobacco Use    Smoking status: Former     Current packs/day: 0.00     Types: Cigarettes     Start date:      Quit date: 2014     Years since quittin.3    Smokeless tobacco: Former    Tobacco comments:      on and off for ten years    Vaping Use    Vaping status: Former    Substances: THC   Substance Use Topics    Alcohol use: Yes     Alcohol/week: 1.2 oz     Types: 2 Standard drinks or equivalent per week     Comment: social    Drug use: Not Currently     Frequency: 7.0 times per week     Types: Marijuana, Inhaled     Comment: no longer using marijuana   [3]   Current Outpatient Medications Ordered in Epic   Medication Sig Dispense Refill    Semaglutide (WEGOVY) 0.25 MG/0.5ML Solution Auto-injector Pen-injector Inject 0.5 mL under the skin every 7 days. 2 mL 0    metFORMIN (GLUCOPHAGE) 500 MG Tab Take 1 Tablet by mouth 2 times a day. Start once daily for the first week. 180 Tablet 1    VYVANSE 40 MG Cap qd      ramelteon (ROZEREM) 8 MG tablet Take 8 mg by mouth every evening. (Patient taking differently: Take 8 mg by mouth every evening. prn)      ibuprofen (MOTRIN) 200 MG Tab Take 800 mg by mouth every 6 hours as needed. Indications: Pain       No current Epic-ordered facility-administered medications on file.

## 2025-05-21 NOTE — ASSESSMENT & PLAN NOTE
BP today 128/90. Repeat /84. No current BP mediction.   She is on Vyvanse that is prescribed by her psychiatrist. At psychiatrist office BP is checked with wrist cuff. Last office BP was normal.

## 2025-05-21 NOTE — ASSESSMENT & PLAN NOTE
BMI today 42.06 kg/m². She is eating high protein, low carb diet with intermittent fasting. She is going to the gym 2 times a week. She last seen dietician 10 years ago, agreeable to new referral.  She would like to start medication to help with weight loss.  Plan to start with Wegovy.

## 2025-05-21 NOTE — ASSESSMENT & PLAN NOTE
"Her psychiatrist is prescribing her Vyvanse and she can notice higher HR and higher BP when taking the medication. We completed EKG last visit. Psychiatrist requesting letter as EKG said \"abnormal\".   "

## 2025-05-21 NOTE — ASSESSMENT & PLAN NOTE
Here to follow-up on Metformin.  She is taking metformin 500 mg twice daily.  She has not noticed any changes in her weight.  Continue metformin 500 mg twice daily.

## 2025-05-28 NOTE — Clinical Note
REFERRAL APPROVAL NOTICE         Sent on May 28, 2025                   Earlene Stephanieparth Lopezens  [address] [place] [address]                   Dear Ms. Parks,    After a careful review of the medical information and benefit coverage, [facility] has processed your referral. See below for additional details.    If applicable, you must be actively enrolled with your insurance for coverage of the authorized service. If you have any questions regarding your coverage, please contact your insurance directly.    REFERRAL INFORMATION   Referral #:  [id number]  Referred-To Department    Referred-By Provider:  CALLY Davis   [facility]      [address]  [place] [address]  [phone] [address]  [place] [id number] [phone]    Referral Start Date:  05/21/2025  Referral End Date:   05/21/2026             SCHEDULING  If you do not already have an appointment, please call [phone] to make an appointment.     MORE INFORMATION  If you do not already have a [email or web address] account, sign up at: [email or web address]  You can access your medical information, make appointments, see lab results, billing information, and more.  If you have questions regarding this referral, please contact  the [facility] Referrals department at:             [phone]. Monday - Friday 8:00AM - 5:00PM.     Sincerely,    [facility]

## 2025-07-03 ENCOUNTER — APPOINTMENT (OUTPATIENT)
Dept: MEDICAL GROUP | Facility: PHYSICIAN GROUP | Age: 35
End: 2025-07-03
Payer: COMMERCIAL

## 2025-08-18 ENCOUNTER — APPOINTMENT (OUTPATIENT)
Dept: MEDICAL GROUP | Facility: PHYSICIAN GROUP | Age: 35
End: 2025-08-18
Payer: COMMERCIAL